# Patient Record
Sex: MALE | Race: WHITE | Employment: FULL TIME | URBAN - METROPOLITAN AREA
[De-identification: names, ages, dates, MRNs, and addresses within clinical notes are randomized per-mention and may not be internally consistent; named-entity substitution may affect disease eponyms.]

---

## 2017-07-16 PROBLEM — N20.0 CALCULUS OF BOTH KIDNEYS: Status: ACTIVE | Noted: 2017-07-16

## 2017-07-20 PROBLEM — Z80.42 FAMILY HISTORY OF PROSTATE CANCER: Status: ACTIVE | Noted: 2017-07-20

## 2017-07-20 PROBLEM — R31.29 MICROSCOPIC HEMATURIA: Status: ACTIVE | Noted: 2017-07-20

## 2019-12-04 NOTE — DISCHARGE INSTRUCTIONS
[facility]  Dr. Mary Beth Valenzuela Post-Operative Instructions Total Hip Replacement    ACTIVITIES :  1. You may be up and walking about the house with your walker. 2.  Activities around the house, such as washing dishes, fixing light meals, and your own personal care are fine. 3.  Avoid strenuous activities, such as vacuuming, lifting laundry or grocery bags. 4.  Walking is the best way to rebuild strength and stamina. Start SLOWLY and gradually increase your distance. 5.  Avoid any jogging, running or excessive stair-climbing   6. Your home physical therapist will work with you for the first 7-14 days. 7.  Follow-up with Dr. Harriet Gracia in 10-14 days. BATHING and INCISION CARE:  1. The incision may be tender to touch or feel numb: this is normal.   2.  Keep the incision clean and dry no showering until your follow-up appointment. The incision will be closed with sutures under the skin and the skin will be glued. 3.  Do not apply any lotions, ointments or oils on the incision. 4.  If you notice any excessive swelling, redness, or persistent drainage around the incision, notify the office immediately. DRIVIN. You should not drive until after your follow-up appointment. 2.  You can be in a vehicle for short distances, but if you travel any long distance, please stop about every 30 minutes and walk/stretch. 3.  You should NEVER drive while taking narcotic medication. RETURN TO WORK :  1. The decision to return to work will be determined on an individual basis. 2.  Many people who have a strenuous job (construction, heavy labor, etc) may need to be off work for up to 12 weeks. 3.  If you need a work note, please let us know as soon as possible, and not the same day you are planning to return to work. NUTRITION :  1.  Good nutrition is an essential part of healing. 2.  You should eat a balanced diet each day, including fruits, vegetables, dairy products and protein. 3.  Remember to drink plenty of water. 4.  If you have not had a bowel movement within 3 days of surgery, you will need to use a laxative or suppository that can be obtained over-the-counter at your local pharmacy. MEDICATIONS -  1. You may resume the medications you were taking before surgery. 2.  You will receive a prescription for pain medication at discharge from the hospital. The pain medication works best if taken before the pain becomes severe. 3.  To reduce stomach upset, always take the medication with food. 4.  Begin to wean yourself off the pain medication during the second week after discharge. 5.  If you need a refill, please call the office during working hours at least 2 days before your prescription runs out. Do not wait until your bottle is empty to call for a refill. 6.  You will also be prescribed a blood thinner that you will take by injection for 21 days post-operatively. 7.  DO NOT drive if you are taking narcotic pain medications. HOME HEALTH CARE:  1.   A home health care service has been set-up for you to help assist you once you leave the hospital.  2.  They will contact you either before you leave the hospital or within 24 hours once you have been discharged home. 3. A nurse will assist you with your dressing changes and a Physical Therapist with help you with your therapy needs. CALL THE OFFICE:   If you have severe pain unrelieved by the medications;   If you have a fever of 101.0°F or greater;    If you notice excessive swelling, redness, or persistent drainage from the incision or IV site; [facility] office number is [phone] from 8:00am to 5:00pm Monday through Friday. After 5:00pm, on weekends, or holidays, please leave a message with our answering service and the doctor on-call will get back to you shortly.

## 2019-12-05 ENCOUNTER — HOSPITAL ENCOUNTER (OUTPATIENT)
Dept: PREADMISSION TESTING | Age: 64
Discharge: HOME | End: 2019-12-05
Payer: COMMERCIAL

## 2019-12-05 VITALS — HEIGHT: 69 IN | WEIGHT: 222 LBS | BODY MASS INDEX: 32.88 KG/M2

## 2019-12-05 LAB
ALBUMIN SERPL-MCNC: 4.1 G/DL (ref 3.4–5)
ALBUMIN/GLOB SERPL: 1.4 {RATIO} (ref 0.8–1.7)
ALP SERPL-CCNC: 88 U/L (ref 45–117)
ALT SERPL-CCNC: 51 U/L (ref 16–61)
ANION GAP SERPL CALC-SCNC: 6 MMOL/L (ref 3–18)
APPEARANCE UR: CLEAR
APTT PPP: 28.4 SEC (ref 23–36.4)
AST SERPL-CCNC: 22 U/L (ref 10–38)
BILIRUB SERPL-MCNC: 0.6 MG/DL (ref 0.2–1)
BILIRUB UR QL: NEGATIVE
BUN SERPL-MCNC: 19 MG/DL (ref 7–18)
BUN/CREAT SERPL: 20 (ref 12–20)
CALCIUM SERPL-MCNC: 9.5 MG/DL (ref 8.5–10.1)
CHLORIDE SERPL-SCNC: 106 MMOL/L (ref 100–111)
CO2 SERPL-SCNC: 28 MMOL/L (ref 21–32)
COLOR UR: YELLOW
CREAT SERPL-MCNC: 0.94 MG/DL (ref 0.6–1.3)
ERYTHROCYTE [DISTWIDTH] IN BLOOD BY AUTOMATED COUNT: 14.1 % (ref 11.6–14.5)
ERYTHROCYTE [SEDIMENTATION RATE] IN BLOOD: 4 MM/HR (ref 0–20)
GLOBULIN SER CALC-MCNC: 3 G/DL (ref 2–4)
GLUCOSE SERPL-MCNC: 103 MG/DL (ref 74–99)
GLUCOSE UR STRIP.AUTO-MCNC: NEGATIVE MG/DL
HCT VFR BLD AUTO: 48.7 % (ref 36–48)
HGB BLD-MCNC: 16.5 G/DL (ref 13–16)
HGB UR QL STRIP: NEGATIVE
INR PPP: 1 (ref 0.8–1.2)
KETONES UR QL STRIP.AUTO: NEGATIVE MG/DL
LEUKOCYTE ESTERASE UR QL STRIP.AUTO: NEGATIVE
MCH RBC QN AUTO: 29 PG (ref 24–34)
MCHC RBC AUTO-ENTMCNC: 33.9 G/DL (ref 31–37)
MCV RBC AUTO: 85.7 FL (ref 74–97)
NITRITE UR QL STRIP.AUTO: NEGATIVE
PH UR STRIP: 6 [PH] (ref 5–8)
PLATELET # BLD AUTO: 215 K/UL (ref 135–420)
PMV BLD AUTO: 9.3 FL (ref 9.2–11.8)
POTASSIUM SERPL-SCNC: 4.2 MMOL/L (ref 3.5–5.5)
PROT SERPL-MCNC: 7.1 G/DL (ref 6.4–8.2)
PROT UR STRIP-MCNC: NEGATIVE MG/DL
PROTHROMBIN TIME: 12.5 SEC (ref 11.5–15.2)
RBC # BLD AUTO: 5.68 M/UL (ref 4.7–5.5)
SODIUM SERPL-SCNC: 140 MMOL/L (ref 136–145)
SP GR UR REFRACTOMETRY: 1.02 (ref 1–1.03)
UROBILINOGEN UR QL STRIP.AUTO: 1 EU/DL (ref 0.2–1)
WBC # BLD AUTO: 7.7 K/UL (ref 4.6–13.2)

## 2019-12-05 PROCEDURE — 93005 ELECTROCARDIOGRAM TRACING: CPT

## 2019-12-05 PROCEDURE — 85652 RBC SED RATE AUTOMATED: CPT

## 2019-12-05 PROCEDURE — 85730 THROMBOPLASTIN TIME PARTIAL: CPT

## 2019-12-05 PROCEDURE — 85027 COMPLETE CBC AUTOMATED: CPT

## 2019-12-05 PROCEDURE — 36415 COLL VENOUS BLD VENIPUNCTURE: CPT

## 2019-12-05 PROCEDURE — 85610 PROTHROMBIN TIME: CPT

## 2019-12-05 PROCEDURE — 80053 COMPREHEN METABOLIC PANEL: CPT

## 2019-12-05 PROCEDURE — 81003 URINALYSIS AUTO W/O SCOPE: CPT

## 2019-12-05 RX ORDER — ACETAMINOPHEN 500 MG
TABLET ORAL
COMMUNITY

## 2019-12-05 RX ORDER — CEFAZOLIN SODIUM/WATER 2 G/20 ML
2 SYRINGE (ML) INTRAVENOUS ONCE
Status: CANCELLED | OUTPATIENT
Start: 2019-12-19 | End: 2019-12-19

## 2019-12-05 RX ORDER — SODIUM CHLORIDE, SODIUM LACTATE, POTASSIUM CHLORIDE, CALCIUM CHLORIDE 600; 310; 30; 20 MG/100ML; MG/100ML; MG/100ML; MG/100ML
125 INJECTION, SOLUTION INTRAVENOUS CONTINUOUS
Status: CANCELLED | OUTPATIENT
Start: 2019-12-05

## 2019-12-05 RX ORDER — NAPROXEN 250 MG/1
TABLET ORAL
COMMUNITY

## 2019-12-05 NOTE — PERIOP NOTES
Please arrange for your transportation home Denies any prosthetics. Chg wipes given. Patient states family physician is aware of upcoming procedure/surgery. Stop nsaids 7 days prior to [place]. Do not put any lotion, jewelry, makeup, fingernail or toenail polish; no wigs, no private piercings; no tictac,gum and mouthwash. Denies sleep apnea. Denies family history of anesthesia complications. Please be aware that due to unforeseen circumstances, delays may occur and your patience will be appreciated. Denies shortness of breath nor chest pain while climbing stairs.  States recently has had anxiety before procedures

## 2019-12-06 LAB
BACTERIA SPEC CULT: NORMAL
BACTERIA SPEC CULT: NORMAL
SERVICE CMNT-IMP: NORMAL

## 2019-12-07 LAB
ATRIAL RATE: 81 BPM
CALCULATED P AXIS, ECG09: 58 DEGREES
CALCULATED R AXIS, ECG10: -11 DEGREES
CALCULATED T AXIS, ECG11: 20 DEGREES
DIAGNOSIS, 93000: NORMAL
P-R INTERVAL, ECG05: 198 MS
Q-T INTERVAL, ECG07: 372 MS
QRS DURATION, ECG06: 98 MS
QTC CALCULATION (BEZET), ECG08: 432 MS
VENTRICULAR RATE, ECG03: 81 BPM

## 2019-12-18 ENCOUNTER — ANESTHESIA EVENT (OUTPATIENT)
Dept: SURGERY | Age: 64
DRG: 470 | End: 2019-12-18
Payer: COMMERCIAL

## 2019-12-18 PROBLEM — M16.12 OSTEOARTHRITIS OF LEFT HIP: Status: ACTIVE | Noted: 2019-12-18

## 2019-12-18 NOTE — H&P
Patient Name:  Salvador Whalen ([id number])  YOB: 1955      Chief Complaint:  Cervical and lumbar pain and left hip pain. History of Chief Complaint:  Mr. Claude Fendt comes in today for evaluation of multiple complaints. He was previously evaluated and noted to have degenerative changes in the lumbar spine with stenosis, cervical spine stenosis as well as bilateral hip arthrosis, left more than right. She underwent an injection with minimal resolve of hip complaints as well as mild improvement in his lumbar complaints. He reports picking up something heavy over the past few days straining his lumbar spine again. Additionally he is pending cervical epidural for his complaints of upper arm and extremity pain. Today he presents and his symptoms about the hip continue to be progressive with limitations of motion and difficulty with ambulation for short distances as well as limitations of daily life and difficulty putting on shoes and socks. Past Medical/Surgical History:    Disease/Disorder Date Side Surgery Date Side Comment   Arthritis         Enlarged prostate            Knee replacement 1998 left    Broken nose   Rhinoplasty        Allergies:    Ingredient Reaction Medication Name Comment   NO KNOWN ALLERGIES          Current Medications:    Medication Directions   Valium 5 mg tablet take 1 tablet (5MG)  by oral route 1/2 hour prior to MRI; MUST have    Valium 5 mg tablet take 1 tablet (5MG)  by oral route 1/2 hour prior to MRI; MUST have      Social History:    SMOKING  Status Tobacco Type Units Per Day Yrs Used   Never smoker        ALCOHOL  There is a history of alcohol use. consumed occasionally. Family History:    Disease Detail Family Member Age Cause of Death Comments   Family history of Hypertension   N    Alzheimer's disease Mother  N    Cancer, lung Father  Y      Review of Systems:    Pertinent positives include joint stiffness.   Pertinent negatives include chills, fever and muscle weakness. Vitals:  Date BP Pulse Temp (F) Resp. (per min.) Height (Total in.) Weight (lbs.) BMI   11/13/2019     70.00 210.00 30.13   10/14/2019     70.00  30.13   09/11/2019     70.00  30.13     Physical Examination:    Psychiatric/General:  No acute distress; pleasant and accommodating. HEENT:  Moist mucous membranes intact. Lymphatic:  Neck is supple with no lymphadenopathy upon evaluation. Respiratory:   Equal bilateral chest wall movement with inspiration; no wheezes or stridor audible. Cardiovascular:    Regular rate and rhythm, as noted by upper extremity pulses. Musculoskeletal: On evaluation of the lower extremity, evaluation of the left hip with forward flexion, internal and external rotation is with limitation of motion. Standing and evaluation of gait is antalgic. Gross motor is intact. Skin is without ulceration or lesion. Assessment: Lower extremity complaints as noted above with focal symptoms about the hip are consistent with underlying arthrosis as appreciated on previous evaluation and x-rays. Recommendation:  I reviewed with him the findings and recommendations for moving forward with total hip arthroplasty. The risks include infection, bleeding, deep venous thrombosis, pulmonary embolism, heart attack, stroke, death, arthrofibrosis, need for manipulation, neurovascular compromise, need for revision surgical intervention, persistent long-term pain, need for chronic pain management, and metallic allergies. We will continue with plans for scheduled injection for his cervical spine. Additionally we can try focal injection of the lower lumbar spine on the right for his continued complaints about his new focal pain after his strain injury most recently.           Jean Garner,

## 2019-12-19 ENCOUNTER — HOSPITAL ENCOUNTER (INPATIENT)
Age: 64
LOS: 1 days | Discharge: HOME HEALTH | DRG: 470 | End: 2019-12-20
Attending: ORTHOPAEDIC SURGERY | Admitting: ORTHOPAEDIC SURGERY
Payer: COMMERCIAL

## 2019-12-19 ENCOUNTER — ANESTHESIA (OUTPATIENT)
Dept: SURGERY | Age: 64
DRG: 470 | End: 2019-12-19
Payer: COMMERCIAL

## 2019-12-19 ENCOUNTER — APPOINTMENT (OUTPATIENT)
Dept: GENERAL RADIOLOGY | Age: 64
DRG: 470 | End: 2019-12-19
Attending: ORTHOPAEDIC SURGERY
Payer: COMMERCIAL

## 2019-12-19 ENCOUNTER — APPOINTMENT (OUTPATIENT)
Dept: CT IMAGING | Age: 64
DRG: 470 | End: 2019-12-19
Attending: FAMILY MEDICINE
Payer: COMMERCIAL

## 2019-12-19 DIAGNOSIS — M16.12 PRIMARY OSTEOARTHRITIS OF LEFT HIP: Primary | ICD-10-CM

## 2019-12-19 PROBLEM — Z96.649 STATUS POST THR (TOTAL HIP REPLACEMENT): Status: ACTIVE | Noted: 2019-12-19

## 2019-12-19 LAB
ABO + RH BLD: NORMAL
BLOOD GROUP ANTIBODIES SERPL: NORMAL
SPECIMEN EXP DATE BLD: NORMAL

## 2019-12-19 PROCEDURE — 86900 BLOOD TYPING SEROLOGIC ABO: CPT

## 2019-12-19 PROCEDURE — 77030013708 HC HNDPC SUC IRR PULS STRY –B: Performed by: ORTHOPAEDIC SURGERY

## 2019-12-19 PROCEDURE — 77030006643: Performed by: ANESTHESIOLOGY

## 2019-12-19 PROCEDURE — C9290 INJ, BUPIVACAINE LIPOSOME: HCPCS | Performed by: ORTHOPAEDIC SURGERY

## 2019-12-19 PROCEDURE — 65270000029 HC RM PRIVATE

## 2019-12-19 PROCEDURE — 77030018846 HC SOL IRR STRL H20 ICUM -A: Performed by: ORTHOPAEDIC SURGERY

## 2019-12-19 PROCEDURE — 97161 PT EVAL LOW COMPLEX 20 MIN: CPT

## 2019-12-19 PROCEDURE — 74011000258 HC RX REV CODE- 258: Performed by: ORTHOPAEDIC SURGERY

## 2019-12-19 PROCEDURE — 77030038010: Performed by: ORTHOPAEDIC SURGERY

## 2019-12-19 PROCEDURE — C1776 JOINT DEVICE (IMPLANTABLE): HCPCS | Performed by: ORTHOPAEDIC SURGERY

## 2019-12-19 PROCEDURE — 73501 X-RAY EXAM HIP UNI 1 VIEW: CPT

## 2019-12-19 PROCEDURE — 74011000250 HC RX REV CODE- 250: Performed by: REGISTERED NURSE

## 2019-12-19 PROCEDURE — 97116 GAIT TRAINING THERAPY: CPT

## 2019-12-19 PROCEDURE — 77030027138 HC INCENT SPIROMETER -A: Performed by: ORTHOPAEDIC SURGERY

## 2019-12-19 PROCEDURE — 77030034694 HC SCPL CANADY PLSM DISP USMD -E: Performed by: ORTHOPAEDIC SURGERY

## 2019-12-19 PROCEDURE — 77030031140 HC SUT VCRL3 J&J -A: Performed by: ORTHOPAEDIC SURGERY

## 2019-12-19 PROCEDURE — 76010000149 HC OR TIME 1 TO 1.5 HR: Performed by: ORTHOPAEDIC SURGERY

## 2019-12-19 PROCEDURE — 74011000250 HC RX REV CODE- 250: Performed by: ANESTHESIOLOGY

## 2019-12-19 PROCEDURE — 77030018836 HC SOL IRR NACL ICUM -A: Performed by: ORTHOPAEDIC SURGERY

## 2019-12-19 PROCEDURE — 77030040361 HC SLV COMPR DVT MDII -B: Performed by: ORTHOPAEDIC SURGERY

## 2019-12-19 PROCEDURE — 77030020782 HC GWN BAIR PAWS FLX 3M -B: Performed by: ORTHOPAEDIC SURGERY

## 2019-12-19 PROCEDURE — 77030029099 HC BN WAX SSPC -A: Performed by: ORTHOPAEDIC SURGERY

## 2019-12-19 PROCEDURE — 77030031139 HC SUT VCRL2 J&J -A: Performed by: ORTHOPAEDIC SURGERY

## 2019-12-19 PROCEDURE — 70450 CT HEAD/BRAIN W/O DYE: CPT

## 2019-12-19 PROCEDURE — 77030003666 HC NDL SPINAL BD -A: Performed by: ORTHOPAEDIC SURGERY

## 2019-12-19 PROCEDURE — 74011250636 HC RX REV CODE- 250/636: Performed by: ORTHOPAEDIC SURGERY

## 2019-12-19 PROCEDURE — 77010033678 HC OXYGEN DAILY

## 2019-12-19 PROCEDURE — 77030013079 HC BLNKT BAIR HGGR 3M -A: Performed by: ANESTHESIOLOGY

## 2019-12-19 PROCEDURE — 74011250637 HC RX REV CODE- 250/637: Performed by: SPECIALIST

## 2019-12-19 PROCEDURE — 74011250636 HC RX REV CODE- 250/636: Performed by: SPECIALIST

## 2019-12-19 PROCEDURE — 77030008477 HC STYL SATN SLP COVD -A: Performed by: ANESTHESIOLOGY

## 2019-12-19 PROCEDURE — 74011250637 HC RX REV CODE- 250/637: Performed by: ORTHOPAEDIC SURGERY

## 2019-12-19 PROCEDURE — 77030008683 HC TU ET CUF COVD -A: Performed by: ANESTHESIOLOGY

## 2019-12-19 PROCEDURE — 74011000250 HC RX REV CODE- 250: Performed by: ORTHOPAEDIC SURGERY

## 2019-12-19 PROCEDURE — 77030037875 HC DRSG MEPILEX <16IN BORD MOLN -A: Performed by: ORTHOPAEDIC SURGERY

## 2019-12-19 PROCEDURE — 0SRB04A REPLACEMENT OF LEFT HIP JOINT WITH CERAMIC ON POLYETHYLENE SYNTHETIC SUBSTITUTE, UNCEMENTED, OPEN APPROACH: ICD-10-PCS | Performed by: ORTHOPAEDIC SURGERY

## 2019-12-19 PROCEDURE — 76210000017 HC OR PH I REC 1.5 TO 2 HR: Performed by: ORTHOPAEDIC SURGERY

## 2019-12-19 PROCEDURE — 77030039267 HC ADH SKN EXOFIN S2SG -B: Performed by: ORTHOPAEDIC SURGERY

## 2019-12-19 PROCEDURE — 36415 COLL VENOUS BLD VENIPUNCTURE: CPT

## 2019-12-19 PROCEDURE — 76060000033 HC ANESTHESIA 1 TO 1.5 HR: Performed by: ORTHOPAEDIC SURGERY

## 2019-12-19 PROCEDURE — 74011250636 HC RX REV CODE- 250/636: Performed by: REGISTERED NURSE

## 2019-12-19 DEVICE — PINNACLE GRIPTION ACETABULAR SHELL SECTOR 52MM OD
Type: IMPLANTABLE DEVICE | Site: HIP | Status: FUNCTIONAL
Brand: PINNACLE GRIPTION

## 2019-12-19 DEVICE — HEAD FEM DIA36MM +1.5MM OFFSET 12/14 TAPR HIP CERAMIC: Type: IMPLANTABLE DEVICE | Site: HIP | Status: FUNCTIONAL

## 2019-12-19 DEVICE — PINNACLE HIP SOLUTIONS ALTRX POLYETHYLENE ACETABULAR LINER NEUTRAL 36MM ID 52MM OD
Type: IMPLANTABLE DEVICE | Site: HIP | Status: FUNCTIONAL
Brand: PINNACLE ALTRX

## 2019-12-19 DEVICE — COMPONENT TOT HIP PRIMARY CERM ALTRX: Type: IMPLANTABLE DEVICE | Site: HIP | Status: FUNCTIONAL

## 2019-12-19 DEVICE — STEM FEM SZ 5 L105MM 12/14 TAPR HI OFFSET HIP DUOFIX CLLRD: Type: IMPLANTABLE DEVICE | Site: HIP | Status: FUNCTIONAL

## 2019-12-19 DEVICE — APEX HOLE ELIMINATOR - PS
Type: IMPLANTABLE DEVICE | Site: HIP | Status: FUNCTIONAL
Brand: APEX

## 2019-12-19 RX ORDER — NALOXONE HYDROCHLORIDE 0.4 MG/ML
0.4 INJECTION, SOLUTION INTRAMUSCULAR; INTRAVENOUS; SUBCUTANEOUS AS NEEDED
Status: DISCONTINUED | OUTPATIENT
Start: 2019-12-19 | End: 2019-12-20 | Stop reason: HOSPADM

## 2019-12-19 RX ORDER — ONDANSETRON 2 MG/ML
4 INJECTION INTRAMUSCULAR; INTRAVENOUS
Status: DISCONTINUED | OUTPATIENT
Start: 2019-12-19 | End: 2019-12-20 | Stop reason: HOSPADM

## 2019-12-19 RX ORDER — DEXAMETHASONE SODIUM PHOSPHATE 4 MG/ML
8 INJECTION, SOLUTION INTRA-ARTICULAR; INTRALESIONAL; INTRAMUSCULAR; INTRAVENOUS; SOFT TISSUE ONCE
Status: COMPLETED | OUTPATIENT
Start: 2019-12-19 | End: 2019-12-19

## 2019-12-19 RX ORDER — CEFAZOLIN SODIUM/WATER 2 G/20 ML
2 SYRINGE (ML) INTRAVENOUS ONCE
Status: COMPLETED | OUTPATIENT
Start: 2019-12-19 | End: 2019-12-19

## 2019-12-19 RX ORDER — CEFAZOLIN SODIUM/WATER 2 G/20 ML
2 SYRINGE (ML) INTRAVENOUS EVERY 8 HOURS
Status: COMPLETED | OUTPATIENT
Start: 2019-12-19 | End: 2019-12-20

## 2019-12-19 RX ORDER — OXYCODONE HYDROCHLORIDE 5 MG/1
10 TABLET ORAL
Status: DISCONTINUED | OUTPATIENT
Start: 2019-12-19 | End: 2019-12-20 | Stop reason: HOSPADM

## 2019-12-19 RX ORDER — PROPOFOL 10 MG/ML
INJECTION, EMULSION INTRAVENOUS AS NEEDED
Status: DISCONTINUED | OUTPATIENT
Start: 2019-12-19 | End: 2019-12-19 | Stop reason: HOSPADM

## 2019-12-19 RX ORDER — ACETAMINOPHEN 500 MG
1000 TABLET ORAL ONCE
Status: COMPLETED | OUTPATIENT
Start: 2019-12-19 | End: 2019-12-19

## 2019-12-19 RX ORDER — CELECOXIB 100 MG/1
200 CAPSULE ORAL ONCE
Status: COMPLETED | OUTPATIENT
Start: 2019-12-19 | End: 2019-12-19

## 2019-12-19 RX ORDER — ONDANSETRON 2 MG/ML
4 INJECTION INTRAMUSCULAR; INTRAVENOUS ONCE
Status: DISCONTINUED | OUTPATIENT
Start: 2019-12-19 | End: 2019-12-19 | Stop reason: HOSPADM

## 2019-12-19 RX ORDER — ROCURONIUM BROMIDE 10 MG/ML
INJECTION, SOLUTION INTRAVENOUS AS NEEDED
Status: DISCONTINUED | OUTPATIENT
Start: 2019-12-19 | End: 2019-12-19 | Stop reason: HOSPADM

## 2019-12-19 RX ORDER — SODIUM CHLORIDE, SODIUM LACTATE, POTASSIUM CHLORIDE, CALCIUM CHLORIDE 600; 310; 30; 20 MG/100ML; MG/100ML; MG/100ML; MG/100ML
125 INJECTION, SOLUTION INTRAVENOUS CONTINUOUS
Status: DISCONTINUED | OUTPATIENT
Start: 2019-12-19 | End: 2019-12-20 | Stop reason: HOSPADM

## 2019-12-19 RX ORDER — FENTANYL CITRATE 50 UG/ML
25 INJECTION, SOLUTION INTRAMUSCULAR; INTRAVENOUS AS NEEDED
Status: COMPLETED | OUTPATIENT
Start: 2019-12-19 | End: 2019-12-19

## 2019-12-19 RX ORDER — HYDROMORPHONE HYDROCHLORIDE 2 MG/ML
0.2 INJECTION, SOLUTION INTRAMUSCULAR; INTRAVENOUS; SUBCUTANEOUS
Status: DISCONTINUED | OUTPATIENT
Start: 2019-12-19 | End: 2019-12-19 | Stop reason: HOSPADM

## 2019-12-19 RX ORDER — SODIUM CHLORIDE 0.9 % (FLUSH) 0.9 %
5-40 SYRINGE (ML) INJECTION EVERY 8 HOURS
Status: DISCONTINUED | OUTPATIENT
Start: 2019-12-19 | End: 2019-12-20 | Stop reason: HOSPADM

## 2019-12-19 RX ORDER — MIDAZOLAM HYDROCHLORIDE 1 MG/ML
INJECTION, SOLUTION INTRAMUSCULAR; INTRAVENOUS AS NEEDED
Status: DISCONTINUED | OUTPATIENT
Start: 2019-12-19 | End: 2019-12-19 | Stop reason: HOSPADM

## 2019-12-19 RX ORDER — NALOXONE HYDROCHLORIDE 0.4 MG/ML
0.1 INJECTION, SOLUTION INTRAMUSCULAR; INTRAVENOUS; SUBCUTANEOUS AS NEEDED
Status: DISCONTINUED | OUTPATIENT
Start: 2019-12-19 | End: 2019-12-19 | Stop reason: HOSPADM

## 2019-12-19 RX ORDER — PREGABALIN 25 MG/1
25 CAPSULE ORAL ONCE
Status: COMPLETED | OUTPATIENT
Start: 2019-12-19 | End: 2019-12-19

## 2019-12-19 RX ORDER — MAGNESIUM SULFATE 100 %
4 CRYSTALS MISCELLANEOUS AS NEEDED
Status: DISCONTINUED | OUTPATIENT
Start: 2019-12-19 | End: 2019-12-19 | Stop reason: HOSPADM

## 2019-12-19 RX ORDER — LANOLIN ALCOHOL/MO/W.PET/CERES
1 CREAM (GRAM) TOPICAL 3 TIMES DAILY
Status: DISCONTINUED | OUTPATIENT
Start: 2019-12-19 | End: 2019-12-20 | Stop reason: HOSPADM

## 2019-12-19 RX ORDER — HYDROMORPHONE HYDROCHLORIDE 1 MG/ML
1 INJECTION, SOLUTION INTRAMUSCULAR; INTRAVENOUS; SUBCUTANEOUS
Status: DISCONTINUED | OUTPATIENT
Start: 2019-12-19 | End: 2019-12-20 | Stop reason: HOSPADM

## 2019-12-19 RX ORDER — FENTANYL CITRATE 50 UG/ML
50 INJECTION, SOLUTION INTRAMUSCULAR; INTRAVENOUS
Status: DISCONTINUED | OUTPATIENT
Start: 2019-12-19 | End: 2019-12-19 | Stop reason: HOSPADM

## 2019-12-19 RX ORDER — GLYCOPYRROLATE 0.2 MG/ML
INJECTION INTRAMUSCULAR; INTRAVENOUS AS NEEDED
Status: DISCONTINUED | OUTPATIENT
Start: 2019-12-19 | End: 2019-12-19 | Stop reason: HOSPADM

## 2019-12-19 RX ORDER — ONDANSETRON 2 MG/ML
INJECTION INTRAMUSCULAR; INTRAVENOUS AS NEEDED
Status: DISCONTINUED | OUTPATIENT
Start: 2019-12-19 | End: 2019-12-19 | Stop reason: HOSPADM

## 2019-12-19 RX ORDER — KETAMINE HCL IN 0.9 % NACL 50 MG/5 ML
SYRINGE (ML) INTRAVENOUS AS NEEDED
Status: DISCONTINUED | OUTPATIENT
Start: 2019-12-19 | End: 2019-12-19 | Stop reason: HOSPADM

## 2019-12-19 RX ORDER — LABETALOL HYDROCHLORIDE 5 MG/ML
10 INJECTION, SOLUTION INTRAVENOUS ONCE
Status: COMPLETED | OUTPATIENT
Start: 2019-12-19 | End: 2019-12-19

## 2019-12-19 RX ORDER — SODIUM CHLORIDE 9 MG/ML
125 INJECTION, SOLUTION INTRAVENOUS CONTINUOUS
Status: DISCONTINUED | OUTPATIENT
Start: 2019-12-19 | End: 2019-12-19 | Stop reason: HOSPADM

## 2019-12-19 RX ORDER — SODIUM CHLORIDE 0.9 % (FLUSH) 0.9 %
5-40 SYRINGE (ML) INJECTION AS NEEDED
Status: DISCONTINUED | OUTPATIENT
Start: 2019-12-19 | End: 2019-12-20 | Stop reason: HOSPADM

## 2019-12-19 RX ORDER — ENOXAPARIN SODIUM 100 MG/ML
40 INJECTION SUBCUTANEOUS DAILY
Status: DISCONTINUED | OUTPATIENT
Start: 2019-12-20 | End: 2019-12-20 | Stop reason: HOSPADM

## 2019-12-19 RX ORDER — ACETAMINOPHEN 325 MG/1
650 TABLET ORAL EVERY 6 HOURS
Status: DISCONTINUED | OUTPATIENT
Start: 2019-12-19 | End: 2019-12-20 | Stop reason: HOSPADM

## 2019-12-19 RX ORDER — HYDROMORPHONE HYDROCHLORIDE 2 MG/ML
INJECTION, SOLUTION INTRAMUSCULAR; INTRAVENOUS; SUBCUTANEOUS AS NEEDED
Status: DISCONTINUED | OUTPATIENT
Start: 2019-12-19 | End: 2019-12-19 | Stop reason: HOSPADM

## 2019-12-19 RX ORDER — CELECOXIB 100 MG/1
200 CAPSULE ORAL 2 TIMES DAILY
Status: DISCONTINUED | OUTPATIENT
Start: 2019-12-19 | End: 2019-12-20 | Stop reason: HOSPADM

## 2019-12-19 RX ORDER — DEXTROSE MONOHYDRATE 100 MG/ML
125-250 INJECTION, SOLUTION INTRAVENOUS AS NEEDED
Status: DISCONTINUED | OUTPATIENT
Start: 2019-12-19 | End: 2019-12-19 | Stop reason: HOSPADM

## 2019-12-19 RX ORDER — LIDOCAINE HYDROCHLORIDE 20 MG/ML
INJECTION, SOLUTION EPIDURAL; INFILTRATION; INTRACAUDAL; PERINEURAL AS NEEDED
Status: DISCONTINUED | OUTPATIENT
Start: 2019-12-19 | End: 2019-12-19 | Stop reason: HOSPADM

## 2019-12-19 RX ORDER — KETOROLAC TROMETHAMINE 30 MG/ML
15 INJECTION, SOLUTION INTRAMUSCULAR; INTRAVENOUS
Status: COMPLETED | OUTPATIENT
Start: 2019-12-19 | End: 2019-12-19

## 2019-12-19 RX ORDER — DEXMEDETOMIDINE HYDROCHLORIDE 4 UG/ML
INJECTION, SOLUTION INTRAVENOUS AS NEEDED
Status: DISCONTINUED | OUTPATIENT
Start: 2019-12-19 | End: 2019-12-19 | Stop reason: HOSPADM

## 2019-12-19 RX ADMIN — PROPOFOL 200 MG: 10 INJECTION, EMULSION INTRAVENOUS at 14:07

## 2019-12-19 RX ADMIN — SODIUM CHLORIDE, SODIUM LACTATE, POTASSIUM CHLORIDE, AND CALCIUM CHLORIDE 1000 ML: 600; 310; 30; 20 INJECTION, SOLUTION INTRAVENOUS at 11:54

## 2019-12-19 RX ADMIN — DEXMEDETOMIDINE HYDROCHLORIDE 4 MCG: 4 INJECTION, SOLUTION INTRAVENOUS at 14:32

## 2019-12-19 RX ADMIN — SUGAMMADEX 200 MG: 100 INJECTION, SOLUTION INTRAVENOUS at 15:18

## 2019-12-19 RX ADMIN — DEXMEDETOMIDINE HYDROCHLORIDE 4 MCG: 4 INJECTION, SOLUTION INTRAVENOUS at 14:38

## 2019-12-19 RX ADMIN — FENTANYL CITRATE 25 MCG: 50 INJECTION, SOLUTION INTRAMUSCULAR; INTRAVENOUS at 15:52

## 2019-12-19 RX ADMIN — LIDOCAINE HYDROCHLORIDE 100 MG: 20 INJECTION, SOLUTION EPIDURAL; INFILTRATION; INTRACAUDAL; PERINEURAL at 14:07

## 2019-12-19 RX ADMIN — OXYCODONE HYDROCHLORIDE 10 MG: 5 TABLET ORAL at 18:27

## 2019-12-19 RX ADMIN — FENTANYL CITRATE 25 MCG: 50 INJECTION, SOLUTION INTRAMUSCULAR; INTRAVENOUS at 15:59

## 2019-12-19 RX ADMIN — Medication 50 MG: at 14:10

## 2019-12-19 RX ADMIN — TRANEXAMIC ACID 1 G: 100 INJECTION, SOLUTION INTRAVENOUS at 15:17

## 2019-12-19 RX ADMIN — Medication 20 MG: at 14:34

## 2019-12-19 RX ADMIN — CELECOXIB 200 MG: 100 CAPSULE ORAL at 21:36

## 2019-12-19 RX ADMIN — ONDANSETRON HYDROCHLORIDE 4 MG: 2 INJECTION INTRAMUSCULAR; INTRAVENOUS at 14:36

## 2019-12-19 RX ADMIN — GLYCOPYRROLATE 0.2 MG: 0.2 INJECTION INTRAMUSCULAR; INTRAVENOUS at 14:49

## 2019-12-19 RX ADMIN — HYDROMORPHONE HYDROCHLORIDE 0.5 MG: 2 INJECTION, SOLUTION INTRAMUSCULAR; INTRAVENOUS; SUBCUTANEOUS at 14:57

## 2019-12-19 RX ADMIN — ACETAMINOPHEN 1000 MG: 500 TABLET ORAL at 11:50

## 2019-12-19 RX ADMIN — KETOROLAC TROMETHAMINE 15 MG: 30 INJECTION, SOLUTION INTRAMUSCULAR at 16:11

## 2019-12-19 RX ADMIN — Medication 20 MG: at 14:49

## 2019-12-19 RX ADMIN — DEXMEDETOMIDINE HYDROCHLORIDE 8 MCG: 4 INJECTION, SOLUTION INTRAVENOUS at 14:07

## 2019-12-19 RX ADMIN — MIDAZOLAM 2 MG: 1 INJECTION INTRAMUSCULAR; INTRAVENOUS at 14:01

## 2019-12-19 RX ADMIN — DEXAMETHASONE SODIUM PHOSPHATE 8 MG: 4 INJECTION, SOLUTION INTRAMUSCULAR; INTRAVENOUS at 11:52

## 2019-12-19 RX ADMIN — FERROUS SULFATE TAB 325 MG (65 MG ELEMENTAL FE) 325 MG: 325 (65 FE) TAB at 21:36

## 2019-12-19 RX ADMIN — Medication 2 G: at 21:36

## 2019-12-19 RX ADMIN — CELECOXIB 200 MG: 100 CAPSULE ORAL at 11:50

## 2019-12-19 RX ADMIN — ACETAMINOPHEN 650 MG: 325 TABLET ORAL at 23:03

## 2019-12-19 RX ADMIN — PREGABALIN 25 MG: 25 CAPSULE ORAL at 11:50

## 2019-12-19 RX ADMIN — Medication 10 MG: at 14:32

## 2019-12-19 RX ADMIN — FENTANYL CITRATE 25 MCG: 50 INJECTION, SOLUTION INTRAMUSCULAR; INTRAVENOUS at 16:10

## 2019-12-19 RX ADMIN — Medication 2 G: at 14:23

## 2019-12-19 RX ADMIN — FERROUS SULFATE TAB 325 MG (65 MG ELEMENTAL FE) 325 MG: 325 (65 FE) TAB at 18:27

## 2019-12-19 RX ADMIN — SODIUM CHLORIDE, SODIUM LACTATE, POTASSIUM CHLORIDE, AND CALCIUM CHLORIDE: 600; 310; 30; 20 INJECTION, SOLUTION INTRAVENOUS at 14:44

## 2019-12-19 RX ADMIN — ACETAMINOPHEN 650 MG: 325 TABLET ORAL at 18:27

## 2019-12-19 RX ADMIN — Medication 10 MG: at 15:26

## 2019-12-19 RX ADMIN — LABETALOL HYDROCHLORIDE 10 MG: 5 INJECTION INTRAVENOUS at 16:32

## 2019-12-19 RX ADMIN — TRANEXAMIC ACID 1 G: 100 INJECTION, SOLUTION INTRAVENOUS at 14:19

## 2019-12-19 RX ADMIN — FENTANYL CITRATE 25 MCG: 50 INJECTION, SOLUTION INTRAMUSCULAR; INTRAVENOUS at 16:34

## 2019-12-19 RX ADMIN — SODIUM CHLORIDE, SODIUM LACTATE, POTASSIUM CHLORIDE, AND CALCIUM CHLORIDE 125 ML/HR: 600; 310; 30; 20 INJECTION, SOLUTION INTRAVENOUS at 16:01

## 2019-12-19 RX ADMIN — HYDROMORPHONE HYDROCHLORIDE 0.5 MG: 2 INJECTION, SOLUTION INTRAMUSCULAR; INTRAVENOUS; SUBCUTANEOUS at 14:30

## 2019-12-19 RX ADMIN — SODIUM CHLORIDE, SODIUM LACTATE, POTASSIUM CHLORIDE, AND CALCIUM CHLORIDE 125 ML/HR: 600; 310; 30; 20 INJECTION, SOLUTION INTRAVENOUS at 11:49

## 2019-12-19 NOTE — INTERVAL H&P NOTE
H&P Update: 
Corwin Negrete was seen and examined. History and physical has been reviewed. The patient has been examined. There have been no significant clinical changes since the completion of the originally dated History and Physical. 
Patient identified by surgeon; surgical site was confirmed by patient and surgeon. Plan for left total hip

## 2019-12-19 NOTE — PROGRESS NOTES
1220  Received client from PACU in satisfactory condition. Client is a pt of Dr. Anne Marin. Pt had a Left Total Hip Replacement today. Client is A/O X 4. Client is calm and cooperative. Clients PERRLA. Denies numbness or tingling to any extremity. With + Posterior Tibial and Dorsalis Pedis pulses. Capillary Refill less than 3 seconds. Skin is warm , dry and skin color is appropriate to race. Client is negative for JVD. Bibasilar breath sounds clear. No use of accessory muscles. Bowel sounds hypoactive to all quadrants. Abdomen is soft and non-tender. Client has a Mepilex dressing to the left Hip. Dressing is dry and intact. No other skin integrity issues present. Salo hose applied to both legs. Sequential compression device applied. Client has LH 18 gauge PIV present and running LR at 125 ml/hr. . Clients pain is 7/10 on 0-10 scale. Client oriented to call bell use as well as bed use. Client oriented to phone and how to order meals. Call bell within reach. Bed in low position. Three side rails up. Armbands/ Fall alert band intact. Dual skin check done with Juwan Lopez RN. No skin breakdown noted. 1729    Voided 300 ml of clear yellow urine via urinal  1829    Pt medicated with [id number] 10 mg po  For pain level 7/10. Dinner served. Family at bedside.    1915   Pt was seen by PT.

## 2019-12-19 NOTE — OP NOTES
OPERATIVE NOTE    Patient: Bridgette Lubin MRN: [id number]  SSN: [id number]    YOB: 1955  Age: 75 y.o. Sex: male      Indications: This is a 75y.o. year-old male who presents with hip pain. He was positive for hip OA. The patient was admitted for surgery as conservative measures have failed. Date of Procedure: 12/19/2019     Preoperative Diagnosis: LEFT HIP OSTEOARTHRITIS    Postoperative Diagnosis: LEFT HIP OSTEOARTHRITIS      Procedure: Procedure(s):  LEFT TOTAL HIP ARTHROPLASTY ANTERIOR APPROACH WITH C-ARM    Anesthesia: general    Surgeon: Marya John, and Surgical Assistant: Marisabel Mason PA-C    Assistant: Circ-1: Michael De Luna RN  Circ-2: Casey Duckworth RN  Circ-Relief: Andrey Castano  Radiology Technician: Missy Oliva  Scrub RN-1: Eve Russell RN  Scrub RN-2: Amira Alcantara NP  Scrub RN-Relief: Diego Pathak RN  Surg Asst-1: Melissa Bennett    Estimated Blood Loss: 200ml  IVF 2000 ml    Specimens: * No specimens in log *     Drains: none    Implants:   Implant Name Type Inv. Item Serial No.  Lot No. LRB No. Used Action   CUP ACET SECTOR GRIPTION 52MM -- TI - [id number]  CUP ACET SECTOR GRIPTION 52MM -- TI  [facility] [id number] Left 1 Implanted   LINER ACET PINN NEUT [id number] -- ALTRX - [id number]  LINER ACET PINN NEUT [id number] -- ALTRX  [facility] [id number] Left 1 Implanted   ELIMINATOR APEX HOLE POSITIVE - [id number]  ELIMINATOR APEX HOLE POSITIVE  [facility] [id number] Left 1 Implanted   STEM FEM TAPR SZ5 HI OFFSET -- ACTIS - [id number]  STEM FEM TAPR SZ5 HI OFFSET -- ACTIS  [facility] [id number] Left 1 Implanted   HEAD FEM 36MM +1.5MM NK -- BIOLOX DELTA - [id number]  HEAD FEM 36MM +1.5MM NK -- BIOLOX DELTA  [facility] [id number] Left 1 Implanted       Complications: None; patient tolerated the procedure well. Procedure:  The patient was greeted by anesthesia and taken to the operative suite where he underwent general endotracheal anesthesia. He was positioned on a radiolucent [place] hip table with both feet secured and positioned in holding boots. The left hip was sterilely prepped and draped in standard fashion. A 3.5 inch incision was made just below the ASIS in line with the medial border of the tensor fascia rupert. Incision was made and the Tensor was mobilized laterally and the Rectus was mobilized medially. The circumflex vessels were isolated and cauterized to prevent post-operative bleeding. Pre-capsular fat was removed and an anterior H shaped capsulotomy was performed. Retractors were positioned and a femoral neck osteotomy was made with an oscillating saw. The head was dislocated from the acetabulum and the soft tissue labrum was removed with sharp scalpel dissection. Progressive reaming was performed with 45 degrees of abduction and 20 degrees of anteversion. Fluoroscopy was utilized to help confirm appropriate cup placement. A DePuy Sector  52 mm cup was impacted and found to be extremely stable. A single dome hole plug was placed. A 36 liner was placed and implacted and found to be stable. Attention was turned to the femoral shaft. The foot was dropped to the floor, externally rotated 120 degrees and adducted. This exposed the femoral canal nicely with the use of a femoral hook. Progressive broaching was performed until excellent longitudinal and rotational stability was obtained. Trial components were placed and fluoroscopy was utilized to gain excellent leg length equality, hip offset and stability with traction as well as internal and external rotation. Trial components were removed and the tissues were irrigated with 1000 cc of sterile saline with Bacitracin. The cautery unit was utilized to treat the surrounding soft tissues and prevent post operative bleeding and hematoma formation.   Subsequently, a size 5 Depuy Trilock femoral component with a hi neck angle was impacted into position. A 36+1.5 head was placed, impacted and reduced into the acetabular shell. Fluoroscopy confirmed excellent placement, leg length equality, hip offset and stability. The surrounding tissues were injected with 30 cc's of .25 percent Marcaine with epi and 30 mg of Exparell Dilute to 100 ml with saline for post operative pain control. There was minimal bleeding and no drain was placed. The Tensor was reapproximated with running Vicryl. The skin edges were reapproximated with 2-0 Vicry and the skin withDermabond Prineo skin sealant as well as a sterile dressing. The patient recovered from anesthesia and was transferred to the post anesthesia care unit in stable condition.      Danielle Webber DO  12/19/2019  3:23 PM

## 2019-12-19 NOTE — ANESTHESIA POSTPROCEDURE EVALUATION
Post-Anesthesia Evaluation and Assessment    Cardiovascular Function/Vital Signs  Visit Vitals  /88   Pulse 68   Temp 36.8 °C (98.2 °F)   Resp 11   Ht 5' 9\" (1.753 m)   Wt 99.9 kg (220 lb 4 oz)   SpO2 97%   BMI 32.53 kg/m²       Patient is status post Procedure(s):  LEFT TOTAL HIP ARTHROPLASTY ANTERIOR APPROACH WITH C-ARM. Nausea/Vomiting: Controlled. Postoperative hydration reviewed and adequate. Pain:  Pain Scale 1: Visual (12/19/19 1634)  Pain Intensity 1: 6 (12/19/19 1634)   Managed. Neurological Status:   Neuro (WDL): Exceptions to WDL (12/19/19 1600)   At baseline. Mental Status and Level of Consciousness: Arousable. Pulmonary Status:   O2 Device: Nasal cannula (12/19/19 1600)   Adequate oxygenation and airway patent. Complications related to anesthesia: None    Post-anesthesia assessment completed. No concerns. Patient has met all discharge requirements.     Signed By: Nayan Faust MD    December 19, 2019

## 2019-12-19 NOTE — BRIEF OP NOTE
BRIEF OPERATIVE NOTE    Date of Procedure: 12/19/2019   Preoperative Diagnosis: LEFT HIP OSTEOARTHRITIS  Postoperative Diagnosis: LEFT HIP OSTEOARTHRITIS    Procedure(s):  LEFT TOTAL HIP ARTHROPLASTY ANTERIOR APPROACH WITH C-ARM  Surgeon(s) and Role:     * Wesley Mancia,  - Primary         Surgical Assistant: lillie    Surgical Staff:  Circ-1: Adrianna Still RN  Circ-2: Kevin Howard RN  Circ-Relief: Geo Siegel  Radiology Technician: Ryan Patterson  Scrub RN-1: Jack Boyle RN  Scrub RN-2: Zach Mata NP  Scrub RN-Relief: Kimber Bueno RN  Surg Asst-1: Duran Mcclain  Event Time In Time Out   Incision Start 1428    Incision Close 1519      Anesthesia: General   Estimated Blood Loss: 200 ml  IVF 2000 ml  Specimens: * No specimens in log *   Findings: severe oa    Complications: none  Implants:   Implant Name Type Inv.  Item Serial No.  Lot No. LRB No. Used Action   CUP ACET SECTOR GRIPTION 52MM -- TI - [id number]  CUP ACET SECTOR GRIPTION 52MM -- TI  [facility] [employer] [id number] Left 1 Implanted   LINER ACET PINN NEUT [id number] -- ALTRX - [id number]  LINER ACET PINN NEUT [id number] -- ALTRX  [facility] [id number] Left 1 Implanted   ELIMINATOR APEX HOLE POSITIVE - [id number]  ELIMINATOR APEX HOLE POSITIVE  [facility] [id number] Left 1 Implanted   STEM FEM TAPR SZ5 HI OFFSET -- ACTIS - [id number]  STEM FEM TAPR SZ5 HI OFFSET -- ACTIS  [facility] [employer] [id number] Left 1 Implanted   HEAD FEM 36MM +1.5MM NK -- BIOLOX DELTA - [id number]  HEAD FEM 36MM +1.5MM NK -- BIOLOX DELTA  [facility] [employer] [id number] Left 1 Implanted

## 2019-12-19 NOTE — ANESTHESIA PREPROCEDURE EVALUATION
Relevant Problems   No relevant active problems       Anesthetic History        Pertinent negatives: No PONV  Comments: Hypotension, with TKA 12 years ago. Review of Systems / Medical History  Patient summary reviewed, nursing notes reviewed and pertinent labs reviewed    Pulmonary        Sleep apnea (was told to get machine): No treatment      Pertinent negatives: No COPD, asthma and smoker  Comments: asbestosis   Neuro/Psych         Psychiatric history (anxiety)     Cardiovascular                Pertinent negatives: No hypertension, past MI and CAD  Exercise tolerance: >4 METS     GI/Hepatic/Renal  Within defined limits           Pertinent negatives: No GERD, liver disease and renal disease   Endo/Other        Obesity and arthritis     Other Findings   Comments: Recent epidural injections neck & lumbar.      ETOH < 1/ week             Physical Exam    Airway  Mallampati: II  TM Distance: 4 - 6 cm  Neck ROM: normal range of motion   Mouth opening: Normal     Cardiovascular               Dental  No notable dental hx       Pulmonary  Breath sounds clear to auscultation               Abdominal  GI exam deferred       Other Findings            Anesthetic Plan    ASA: 2  Anesthesia type: general          Induction: Intravenous  Anesthetic plan and risks discussed with: Patient and Spouse

## 2019-12-19 NOTE — ROUTINE PROCESS
[address] [place] [facility] TRANSFER - IN REPORT: 
 
Verbal report received from MIGUEL Rodrigues RN(name) on Corwin Caden  being received from [email or web address]) for routine post - op Report consisted of patients Situation, Background, Assessment and  
Recommendations(SBAR). Information from the following report(s) SBAR, Kardex and MAR was reviewed with the receiving nurse. Opportunity for questions and clarification was provided. Assessment completed upon patients arrival to unit and care assumed.

## 2019-12-19 NOTE — PERIOP NOTES
Reviewed PTA medication list with patient/caregiver and patient/caregiver denies any additional medications. Patient admits to having a responsible adult care for them for at least 24 hours after surgery.     Permission granted by patient for a dual skin assessment. Dual skin assessment completed by Allie Ren RN and Meredith Sanches.

## 2019-12-20 VITALS
HEIGHT: 69 IN | HEART RATE: 92 BPM | WEIGHT: 220.25 LBS | RESPIRATION RATE: 16 BRPM | OXYGEN SATURATION: 100 % | TEMPERATURE: 98.3 F | DIASTOLIC BLOOD PRESSURE: 86 MMHG | BODY MASS INDEX: 32.62 KG/M2 | SYSTOLIC BLOOD PRESSURE: 126 MMHG

## 2019-12-20 PROBLEM — R03.0 ELEVATED BLOOD PRESSURE READING WITHOUT DIAGNOSIS OF HYPERTENSION: Status: ACTIVE | Noted: 2019-12-20

## 2019-12-20 PROBLEM — H53.9 VISION CHANGES: Status: ACTIVE | Noted: 2019-12-20

## 2019-12-20 LAB
ANION GAP SERPL CALC-SCNC: 6 MMOL/L (ref 3–18)
BUN SERPL-MCNC: 14 MG/DL (ref 7–18)
BUN/CREAT SERPL: 13 (ref 12–20)
CALCIUM SERPL-MCNC: 8.4 MG/DL (ref 8.5–10.1)
CHLORIDE SERPL-SCNC: 105 MMOL/L (ref 100–111)
CHOLEST SERPL-MCNC: 182 MG/DL
CO2 SERPL-SCNC: 28 MMOL/L (ref 21–32)
CREAT SERPL-MCNC: 1.09 MG/DL (ref 0.6–1.3)
ERYTHROCYTE [DISTWIDTH] IN BLOOD BY AUTOMATED COUNT: 13.8 % (ref 11.6–14.5)
GLUCOSE SERPL-MCNC: 128 MG/DL (ref 74–99)
HBA1C MFR BLD: 5.4 % (ref 4.2–5.6)
HCT VFR BLD AUTO: 42.2 % (ref 36–48)
HCT VFR BLD AUTO: 42.3 % (ref 36–48)
HDLC SERPL-MCNC: 43 MG/DL (ref 40–60)
HDLC SERPL: 4.2 {RATIO} (ref 0–5)
HGB BLD-MCNC: 14.2 G/DL (ref 13–16)
HGB BLD-MCNC: 14.5 G/DL (ref 13–16)
LDLC SERPL CALC-MCNC: 122.8 MG/DL (ref 0–100)
LIPID PROFILE,FLP: ABNORMAL
MCH RBC QN AUTO: 29.3 PG (ref 24–34)
MCHC RBC AUTO-ENTMCNC: 34.3 G/DL (ref 31–37)
MCV RBC AUTO: 85.5 FL (ref 74–97)
PLATELET # BLD AUTO: 206 K/UL (ref 135–420)
PMV BLD AUTO: 9.8 FL (ref 9.2–11.8)
POTASSIUM SERPL-SCNC: 4.4 MMOL/L (ref 3.5–5.5)
RBC # BLD AUTO: 4.95 M/UL (ref 4.7–5.5)
SODIUM SERPL-SCNC: 139 MMOL/L (ref 136–145)
TRIGL SERPL-MCNC: 81 MG/DL (ref ?–150)
TSH SERPL DL<=0.05 MIU/L-ACNC: 0.45 UIU/ML (ref 0.36–3.74)
VLDLC SERPL CALC-MCNC: 16.2 MG/DL
WBC # BLD AUTO: 11.4 K/UL (ref 4.6–13.2)

## 2019-12-20 PROCEDURE — 97165 OT EVAL LOW COMPLEX 30 MIN: CPT

## 2019-12-20 PROCEDURE — 74011250636 HC RX REV CODE- 250/636: Performed by: ORTHOPAEDIC SURGERY

## 2019-12-20 PROCEDURE — 85018 HEMOGLOBIN: CPT

## 2019-12-20 PROCEDURE — 83036 HEMOGLOBIN GLYCOSYLATED A1C: CPT

## 2019-12-20 PROCEDURE — 84443 ASSAY THYROID STIM HORMONE: CPT

## 2019-12-20 PROCEDURE — 97535 SELF CARE MNGMENT TRAINING: CPT

## 2019-12-20 PROCEDURE — 97110 THERAPEUTIC EXERCISES: CPT

## 2019-12-20 PROCEDURE — 97116 GAIT TRAINING THERAPY: CPT

## 2019-12-20 PROCEDURE — 36415 COLL VENOUS BLD VENIPUNCTURE: CPT

## 2019-12-20 PROCEDURE — 80061 LIPID PANEL: CPT

## 2019-12-20 PROCEDURE — 97530 THERAPEUTIC ACTIVITIES: CPT

## 2019-12-20 PROCEDURE — 74011250637 HC RX REV CODE- 250/637: Performed by: ORTHOPAEDIC SURGERY

## 2019-12-20 PROCEDURE — 85027 COMPLETE CBC AUTOMATED: CPT

## 2019-12-20 PROCEDURE — 80048 BASIC METABOLIC PNL TOTAL CA: CPT

## 2019-12-20 RX ORDER — ENOXAPARIN SODIUM 100 MG/ML
40 INJECTION SUBCUTANEOUS DAILY
Qty: 21 SYRINGE | Refills: 0 | Status: SHIPPED | OUTPATIENT
Start: 2019-12-20

## 2019-12-20 RX ORDER — OXYCODONE HYDROCHLORIDE 10 MG/1
10 TABLET ORAL
Qty: 42 TAB | Refills: 0 | Status: SHIPPED | OUTPATIENT
Start: 2019-12-20 | End: 2019-12-20

## 2019-12-20 RX ORDER — CELECOXIB 200 MG/1
200 CAPSULE ORAL 2 TIMES DAILY
Qty: 60 CAP | Refills: 2 | Status: SHIPPED | OUTPATIENT
Start: 2019-12-20 | End: 2020-03-19

## 2019-12-20 RX ORDER — LANOLIN ALCOHOL/MO/W.PET/CERES
325 CREAM (GRAM) TOPICAL 3 TIMES DAILY
Qty: 90 TAB | Refills: 0 | Status: SHIPPED | OUTPATIENT
Start: 2019-12-20

## 2019-12-20 RX ORDER — OXYCODONE HYDROCHLORIDE 10 MG/1
10 TABLET ORAL
Qty: 42 TAB | Refills: 0 | Status: SHIPPED | OUTPATIENT
Start: 2019-12-20 | End: 2020-01-03

## 2019-12-20 RX ORDER — SODIUM CHLORIDE 9 MG/ML
15 INJECTION INTRAMUSCULAR; INTRAVENOUS; SUBCUTANEOUS ONCE
Status: DISCONTINUED | OUTPATIENT
Start: 2019-12-20 | End: 2019-12-20 | Stop reason: HOSPADM

## 2019-12-20 RX ADMIN — ENOXAPARIN SODIUM 40 MG: 40 INJECTION, SOLUTION INTRAVENOUS; SUBCUTANEOUS at 08:38

## 2019-12-20 RX ADMIN — FERROUS SULFATE TAB 325 MG (65 MG ELEMENTAL FE) 325 MG: 325 (65 FE) TAB at 08:38

## 2019-12-20 RX ADMIN — ACETAMINOPHEN 650 MG: 325 TABLET ORAL at 06:07

## 2019-12-20 RX ADMIN — Medication 10 ML: at 06:07

## 2019-12-20 RX ADMIN — CELECOXIB 200 MG: 100 CAPSULE ORAL at 08:37

## 2019-12-20 RX ADMIN — Medication 2 G: at 06:07

## 2019-12-20 RX ADMIN — ACETAMINOPHEN 650 MG: 325 TABLET ORAL at 12:49

## 2019-12-20 NOTE — PROGRESS NOTES
Called floor at 06:00am for MRI SAFETY SCREENING to be completed. Will get patient down for MRI when completed and e-signed.   [address]

## 2019-12-20 NOTE — PROGRESS NOTES
Problem: Mobility Impaired (Adult and Pediatric)  Goal: *Acute Goals and Plan of Care (Insert Text)  Description  In 1-7 days pt will be able to perform:  ST.  Bed mobility:  Rolling L to R to L modified independent for positioning. 2.  Supine to sit to supine S with HR for meals. 3.  Sit to stand to sit S with RW in prep for ambulation. LT.  Gait:  Ambulate >150ft S with RW, WBAT, for home/community mobility. 2.  Stair Negotiation:  Ascend/descend >2 steps CGA with HR for home entry. 3.  Activity tolerance: Tolerate up in chair 1-2 hours for ADLs. 4.  Patient/Family Education:  Patient/family to be independent with HEP for follow-up care and safe discharge. Note:   PHYSICAL THERAPY EVALUATION    Patient: Negra Reynoso (49 y.o. male)  Date: 2019  Primary Diagnosis: Status post THR (total hip replacement) [Z96.649]  Procedure(s) (LRB):  LEFT TOTAL HIP ARTHROPLASTY ANTERIOR APPROACH WITH C-ARM (Left) Day of Surgery   Precautions:   Fall, WBAT    ASSESSMENT :  Based on the objective data described below, the patient presents with decreased functional mobility and independence in regard to bed mobility, transfers, gt quality and tolerance, L hip AROM, L hip strength, pain, balance, activity tolerance, stair negotiation and safety due to recent L ROCÍO surgery. Pt rating pain on numerical pain scale 7/10. Pt required CGA for supine>sit<>stand. Pt required vc for safe techniques. Pt able to participate in gt training w/ RW, WBAT, GB and CGA in hallway w/ antalgic pattern. Pt returned to supine in bed w/ all needs within reach, SCDs applied and ice pack to L hip. Nurse Zechariah Howard aware. [facility] upon hospital d/c. Patient will benefit from skilled intervention to address the above impairments.   Patients rehabilitation potential is considered to be Good  Factors which may influence rehabilitation potential include:   []         None noted  []         Mental ability/status  [] Medical condition  []         Home/family situation and support systems  []         Safety awareness  [x]         Pain tolerance/management  []         Other:      PLAN :  Recommendations and Planned Interventions:  [x]           Bed Mobility Training             []    Neuromuscular Re-Education  [x]           Transfer Training                   []    Orthotic/Prosthetic Training  [x]           Gait Training                          []    Modalities  [x]           Therapeutic Exercises          []    Edema Management/Control  [x]           Therapeutic Activities            [x]    Patient and Family Training/Education  []           Other (comment):    Frequency/Duration: Patient will be followed by physical therapy twice daily to address goals. Discharge Recommendations: Home Health  Further Equipment Recommendations for Discharge: N/A     SUBJECTIVE:   Patient stated I feel pretty good but I am always in pain.     OBJECTIVE DATA SUMMARY:     Past Medical History:   Diagnosis Date    Adverse effect of anesthesia     drop in bp    Arthritis     hip    Kidney stone     Pinched nerve in neck     epidural injection november 2019    Psychiatric disorder     anxiety    Sleep apnea     no machine     Past Surgical History:   Procedure Laterality Date    HX HEENT      broken nose    HX KNEE ARTHROSCOPY      *3 right; *2 left    HX KNEE REPLACEMENT      left *2    HX ORTHOPAEDIC      metal plate 2 screws on right knee    HX OTHER SURGICAL      epidural neck and back     Barriers to Learning/Limitations: None  Compensate with: visual, verbal, tactile, kinesthetic cues/model  Prior Level of Function/Home Situation:   Home Situation  Home Environment: Private residence  # Steps to Enter: 3  Rails to Enter: Yes  Hand Rails : Bilateral  One/Two Story Residence: Two story, live on 1st floor  Living Alone: No  Support Systems: Spouse/Significant Other/Partner  Patient Expects to be Discharged to[de-identified] Private residence  Current DME Used/Available at Home: [place] Behavior:  Neurologic State: Alert; Appropriate for age  Orientation Level: Oriented X4  Cognition: Appropriate decision making; Appropriate for age attention/concentration; Appropriate safety awareness; Follows commands  Safety/Judgement: Awareness of environment  Psychosocial  Patient Behaviors: Calm; Cooperative  Family  Behaviors: Supportive;Calm  Purposeful Interaction: Yes  Pt Identified Daily Priority: Clinical issues (comment)  Caring Interventions: Reassure  Reassure: Therapeutic listening; Informing  Skin Condition/Temp: Dry;Warm  Family  Behaviors: Supportive;Calm  Skin Integrity: Incision (comment)(L hip)  Skin Integumentary  Skin Color: Appropriate for ethnicity  Skin Condition/Temp: Dry;Warm  Skin Integrity: Incision (comment)(L hip)  Turgor: Non-tenting  Hair Growth: Present  Varicosities: Absent  Strength:    Strength: Generally decreased, functional  Tone & Sensation:   Tone: Normal  Sensation: Intact  Range Of Motion:  AROM: Generally decreased, functional  Functional Mobility:  Bed Mobility:  Supine to Sit: Contact guard assistance  Sit to Supine: Contact guard assistance  Transfers:  Sit to Stand: Contact guard assistance(vc; bed elevated)  Stand to Sit: Contact guard assistance(vc)  Balance:   Sitting: Intact  Standing: Intact; With support  Ambulation/Gait Training:  Distance (ft): 180 Feet (ft)  Assistive Device: Walker, rolling;Gait belt  Ambulation - Level of Assistance: Contact guard assistance(vc)  Gait Abnormalities: Antalgic;Decreased step clearance; Step to gait  Left Side Weight Bearing: As tolerated  Base of Support: Shift to right  Stance: Left decreased  Speed/Cassandra: Slow  Step Length: Left shortened;Right shortened  Swing Pattern: Left asymmetrical;Right asymmetrical  Interventions: Safety awareness training; Tactile cues; Verbal cues; Visual/Demos  Therapeutic Exercises:   Encouraged ankle pumps, ankle circles, quad sets and heel slides  Pain:  Pain Scale 1: Numeric (0 - 10)  Pain Intensity 1: 7  Pain Location 1: Hip  Pain Orientation 1: Left  Pain Description 1: Aching  Pain Intervention(s) 1: Medication (see MAR)  Activity Tolerance:   Fair+  Please refer to the flowsheet for vital signs taken during this treatment. After treatment:   [x]         Patient left in no apparent distress sitting up EOB  []         Patient left in no apparent distress in bed  [x]         Call bell left within reach  [x]         Nursing notified  []         Caregiver present  []         Bed alarm activated    COMMUNICATION/EDUCATION:   [x]         Fall prevention education was provided and the patient/caregiver indicated understanding. [x]         Patient/family have participated as able in goal setting and plan of care. [x]         Patient/family agree to work toward stated goals and plan of care. []         Patient understands intent and goals of therapy, but is neutral about his/her participation. []         Patient is unable to participate in goal setting and plan of care.     Thank you for this referral.  Ana Campbell, PT   Time Calculation: 30 mins       Eval Complexity: History: HIGH Complexity :3+ comorbidities / personal factors will impact the outcome/ POC Exam:MEDIUM Complexity : 3 Standardized tests and measures addressing body structure, function, activity limitation and / or participation in recreation  Presentation: LOW Complexity : Stable, uncomplicated  Clinical Decision Making:Low Complexity    Overall Complexity:LOW

## 2019-12-20 NOTE — PROGRESS NOTES
RN called and stated that pt is aware that Dr. Julissa Persaud plans to DC home today if ok with Medicine team after work-up complete. However, pt refusing entire TIA work-up. Does not wish to complete any of the planned studies. Medicine team will sign off since pt refusing work-up and BP now under reasonable control. Would highly recommend that patient follow-up with his primary care physician for further monitoring of elevated blood pressure without history of hypertension. Medicine team is happy to answer any further questions or reconsult should the need arise, thank you.

## 2019-12-20 NOTE — PROGRESS NOTES
OCCUPATIONAL THERAPY EVALUATION/DISCHARGE    Patient: Itzel Flores (73 y.o. male)  Date: 12/20/2019  Primary Diagnosis: Status post THR (total hip replacement) [Z96.649]  Procedure(s) (LRB):  LEFT TOTAL HIP ARTHROPLASTY ANTERIOR APPROACH WITH C-ARM (Left) 1 Day Post-Op   Precautions:   Fall, WBAT  PLOF: independent with ADLs and transfers     ASSESSMENT AND RECOMMENDATIONS:  Based on the objective data described below, the patient presents with requiring S for ADLs and transfers following L THR. Pt presented seated in chair at the beginning of session. Pt participated with UB and LB dressing, bathroom mobility, toilet transfers, toileting and grooming with S during this session. Pt was educated on home safety and fall prevention at home. Pt and spouse verbalized understanding for the same. Pt was left seated in chair at the end of session in NAD, pain 0/10. Skilled occupational therapy is not indicated at this time. Discharge Recommendations: None  Further Equipment Recommendations for Discharge: N/A      SUBJECTIVE:   Patient stated Sally Omar doing alright .     OBJECTIVE DATA SUMMARY:     Past Medical History:   Diagnosis Date    Adverse effect of anesthesia     drop in bp    Arthritis     hip    Kidney stone     Pinched nerve in neck     epidural injection november 2019    Psychiatric disorder     anxiety    Sleep apnea     no machine     Past Surgical History:   Procedure Laterality Date    HX HEENT      broken nose    HX KNEE ARTHROSCOPY      *3 right; *2 left    HX KNEE REPLACEMENT      left *2    HX ORTHOPAEDIC      metal plate 2 screws on right knee    HX OTHER SURGICAL      epidural neck and back     Barriers to Learning/Limitations: None  Compensate with: visual, verbal, tactile, kinesthetic cues/model    Home Situation:   Home Situation  Home Environment: Private residence  # Steps to Enter: 3  Rails to Enter: Yes  Hand Rails : Bilateral  One/Two Story Residence: Two story, live on 1st floor  Lift Chair Available: No  Living Alone: No  Support Systems: Spouse/Significant Other/Partner  Patient Expects to be Discharged to[de-identified] Private residence  Current DME Used/Available at Home: Cane, straight, Walker  Tub or Shower Type: Tub/Shower combination  []     Right hand dominant   []     Left hand dominant    Cognitive/Behavioral Status:  Neurologic State: Alert  Orientation Level: Oriented X4  Cognition: Appropriate for age attention/concentration; Follows commands  Safety/Judgement: Fall prevention    Skin: intact  Edema: none    Vision/Perceptual:    Tracking: Able to track stimulus in all quadrants w/o difficulty    Coordination: BUE  Coordination: Within functional limits  Fine Motor Skills-Upper: Left Intact; Right Intact    Gross Motor Skills-Upper: Left Intact; Right Intact  Balance:  Sitting: Intact  Standing: Intact; With support  Strength: BUE  Strength: Generally decreased, functional  Tone & Sensation: BUE  Tone: Normal  Sensation: Intact  Range of Motion: BUE  AROM: Generally decreased, functional  Functional Mobility and Transfers for ADLs:  Bed Mobility:  Supine to Sit: Supervision  Sit to Supine: Supervision  Scooting: Supervision  Transfers:  Sit to Stand: Supervision  Stand to Sit: Supervision   Toilet Transfer : Supervision   Bathroom Mobility: Supervision/set up  ADL Assessment:  Feeding: Independent    Oral Facial Hygiene/Grooming: Supervision    Upper Body Dressing: Independent    Lower Body Dressing: Supervision    Toileting: Supervision  ADL Intervention:  Grooming  Grooming Assistance: Supervision  Position Performed: Standing  Washing Face: Supervision  Washing Hands: Supervision  Brushing Teeth: Supervision    Upper Body Dressing Assistance  Dressing Assistance: Independent  Pullover Shirt: Independent    Lower Body Dressing Assistance  Dressing Assistance: Supervision  Underpants: Supervision  Pants With Elastic Waist: Supervision  Leg Crossed Method Used: No  Position Performed: Seated in chair  Cues: Lizbeth Peterson  Toileting Assistance: Supervision  Bladder Hygiene: Supervision  Clothing Management: Supervision    Cognitive Retraining  Safety/Judgement: Fall prevention    Therapeutic Exercise:    Pain:  Pain level pre-treatment: 0/10   Pain level post-treatment: 0/10   Pain Intervention(s): Medication (see MAR); Rest, Ice, Repositioning   Response to intervention: Nurse notified, See doc flow    Activity Tolerance:   Good   Please refer to the flowsheet for vital signs taken during this treatment. After treatment:   [x]  Patient left in no apparent distress sitting up in chair  []  Patient left in no apparent distress in bed  [x]  Call bell left within reach  []  Nursing notified  [x]  Caregiver present  []  Bed alarm activated    COMMUNICATION/EDUCATION:   [x]      Role of Occupational Therapy in the acute care setting  [x]      Home safety education was provided and the patient/caregiver indicated understanding. [x]      Patient/family have participated as able and agree with findings and recommendations. []      Patient is unable to participate in plan of care at this time. Thank you for this referral.  Teresita Alexander OTR/L  Time Calculation: 20 mins      Eval Complexity: History: LOW Complexity : Brief history review ; Examination: LOW Complexity : 1-3 performance deficits relating to physical, cognitive , or psychosocial skils that result in activity limitations and / or participation restrictions ;    Decision Making:LOW Complexity : No comorbidities that affect functional and no verbal or physical assistance needed to complete eval tasks

## 2019-12-20 NOTE — PROGRESS NOTES
0727   Pt is awake, alert, oriented x4. Lying in bed. Mepilex silver to left hip dry and intact. Pt denies double vision. Pt stated his Bp is OK and refused  Tests ordered. Dr Samantha Clay and Dr Sevilla Adjutant were made aware earlier by outgoing shift. Dr Sevilla Adjutant ordered pt to go home today. MRI, CT, and Echo were made aware that pt refused tests. 8:44 AM Pt ate breakfast well. Lungs are clear. Abdomen obese, semi soft , with active BS. Pt was taught how to give his own Lovenox. Pt did well with self injection of Lovenox. 1102  Pt ambulated in hallway and stairs. Pt was cleared by PT for D/c.    1149    Pt was seen by OT. Pt ambulated to BR and get dressed up.    1300  Pt ate lunch well. INT removed. Discharge instructions given. Dual AVS reviewed with Ignacio Dutton RN. All medications reviewed individually with patient. Opportunities for questions and concerns provided. Patient's arm band appropriately discarded. 65   Pt discharged per wheelchair accompanied by family.

## 2019-12-20 NOTE — PROGRESS NOTES
Problem: Mobility Impaired (Adult and Pediatric)  Goal: *Acute Goals and Plan of Care (Insert Text)  Description  In 1-7 days pt will be able to perform:  ST.  Bed mobility:  Rolling L to R to L modified independent for positioning. 2.  Supine to sit to supine S with HR for meals. 3.  Sit to stand to sit S with RW in prep for ambulation. LT.  Gait:  Ambulate >150ft S with RW, WBAT, for home/community mobility. 2.  Stair Negotiation:  Ascend/descend >2 steps CGA with HR for home entry. 3.  Activity tolerance: Tolerate up in chair 1-2 hours for ADLs. 4.  Patient/Family Education:  Patient/family to be independent with HEP for follow-up care and safe discharge. 2019 1103 by Jose Funez PT  Outcome: Resolved/Met    PHYSICAL THERAPY TREATMENT/DISCHARGE    Patient: Olu Maxwell (4 y.o. male)  Date: 2019  Diagnosis: Status post THR (total hip replacement) [Z96.649]   Osteoarthritis of left hip  Procedure(s) (LRB):  LEFT TOTAL HIP ARTHROPLASTY ANTERIOR APPROACH WITH C-ARM (Left) 1 Day Post-Op  Precautions: Fall, WBAT  Chart, physical therapy assessment, plan of care and goals were reviewed. ASSESSMENT:  Pt found seated EOB on PT arrival.  Reports pain 4/10. At completion of session, pt demonstrates HEP accurately, bed mobility and transfers with supervision. Pt able to progress gt distance to 250ft with RW with reciprocal gt pattern. Note pt currently has standard walker at [facility] and able to demonstrate use with S using step to gt pattern. Pain less with use of RW. Pt instructed in stair nego and performed same as noted below with bilateral, then single HR use. Pt educated in safety during performance of all above. Left up in chair in room with spouse present and nurse notified pt ready to progress to next level of care with HHPT.   Progression toward goals:  [x]      Goals met  []      Improving appropriately and progressing toward goals  []      Improving slowly and progressing toward goals  []      Not making progress toward goals and plan of care will be adjusted     PLAN:  Patient will be discharged from physical therapy at this time. Rationale for discharge:  [x] Goals Achieved  [] [address]  [] Patient not participating in therapy  [] Other:  Discharge Recommendations:  Home Health  Further Equipment Recommendations for Discharge:  rolling walker (which pt will acquire from his brother by his report)     SUBJECTIVE:   Patient stated I feel fine.     OBJECTIVE DATA SUMMARY:   Critical Behavior:  Neurologic State: Alert  Orientation Level: Oriented X4  Cognition: Appropriate for age attention/concentration, Follows commands  Safety/Judgement: Fall prevention  Functional Mobility Training:  Bed Mobility:  Supine to Sit: Supervision  Sit to Supine: Supervision  Scooting: Supervision  Transfers:  Sit to Stand: Supervision  Stand to Sit: Supervision  Balance:  Sitting: Intact  Standing: Intact; With support  Ambulation/Gait Training:  Distance (ft): 250 Feet (ft)  Assistive Device: Gait belt;Walker, rolling (standard walker initially  Ambulation - Level of Assistance: Supervision  Gait Abnormalities: Antalgic;Decreased step clearance; Step to gait, progressed to reciprocal with use of RW  Left Side Weight Bearing: As tolerated  Speed/Cassandra: Pace decreased (<100 feet/min)  Step Length: Left shortened;Right shortened  Swing Pattern: Left asymmetrical;Right asymmetrical  Interventions: Safety awareness training;Verbal cues; Visual/Demos  Stairs:  Number of Stairs Trained: 8  Stairs - Level of Assistance: Contact guard assistance   Rail Use: Both(L ascending; R descending)  Therapeutic Exercises:   Demonstrates accurately for AP, QS, HS, LAQ and isometric hip adduction  Pain:  Pain Scale 1: Numeric (0 - 10)  Pain Intensity 1: 0 post; 4/10 pre  Pain Location 1: Hip  Pain Orientation 1: Left  Pain Description 1: Sore(post ex)  Pain Intervention(s) 1: Declines  Activity Tolerance:   good  Please refer to the flowsheet for vital signs taken during this treatment.   After treatment:   [x] Patient left in no apparent distress sitting up in chair  [] Patient left in no apparent distress in bed  [x] Call bell left within reach  [x] Nursing notified  [x] Caregiver present  [] Bed alarm activated  Michael Kim, PT   Time Calculation: 39 mins

## 2019-12-20 NOTE — CONSULTS
Medicine Consult    Patient:  Marielos Gonzales 75 y.o. male  Asked to evaluate patient by Dr. Sevilla Adjutant  Primary Care Provider:  Lauren Wagoner DO  Date of Admission:  12/19/2019  Reason for Consult: Vision change    Assessment/Plan   76-year-old male who is pretty healthy except for osteoarthritis of his left hip and BPH. He underwent a left total hip replacement today. Asked to consult for patient because he was having double vision and elevated blood pressure. Patient has no history of hypertension and does not take medications at home for high blood pressure. At the time of the vision change patient's blood pressure was elevated: 157/97. Patient noted that when his blood pressure improved, denies visual changes resolved.      Vision change -resolved   Treat as if possible TIA and ordered full CVA w/u >CTA head/neck, MRI w/o contrast, Echo with bubble study, HgbA1c and Lipid panel   CT head unremarkable  Will order MRI in a.m., patient will need sedative medication to tolerate  If w/u positive, will need to consult neurology    Elevated blood pressure without evidence of hypertension  Blood pressure at the time of initial consult was 157/97, current blood pressure is 138/90  Patient states that he is not experiencing pain and feels that his surgery went well  Will obtain routine lab work including BMP, CBC and TSH  Continue to monitor blood pressures start antihypertensive if systolic blood pressure is sustained greater than 140    Status post left total hip replacement  Defer management to orthopedic team      Patient Active Problem List   Diagnosis Code    Enlarged prostate with lower urinary tract symptoms (LUTS) N40.1    Male erectile dysfunction, unspecified N52.9    Feeling of incomplete bladder emptying R39.14    Urge incontinence N39.41    Calculus of both kidneys N20.0    Microscopic hematuria R31.29    Family history of prostate cancer Z80.42    Osteoarthritis of left hip M16.12    Status post THR (total hip replacement) K63.812       PLAN:    Thank you for allowing us to participate in this patients care. HPI:   CC:  Olu Maxwell is a 75 y.o. male with past medical history significant for GUERRERO requiring CPAP but he is not compliant with wearing his CPAP machine. He states that his other medical problems include arthritis in his left hip which is why he had a left total hip replacement done today as well as a pinched nerve in his cervical neck that Dr. Quillian Felty is managing for him. His only other medical problem stasis is recent diagnosis of anxiety which she states is been having for the past 3 months. He states that it is also accompanied by claustrophobia and he stated that he had to have sedation in order to get MRI he stated that this is all new for him and he is never had this type of symptoms before. He states that he can consider is not aware of any known acute triggers. He does state that he has stressors in his life he states that his mom is 80years old and is has Alzheimer's dementia which is difficult for him he also states that he has older brother who 79 who has a very complicated medical history and that has been causing him stress as well    He states that this evening when he came back from surgery at approximately 6 PM he was watching basketball and he states that he began \"seeing 2 of everything\". He states that he looked at the paper that was on his table and looked at his phone multiple other items to see if the double vision was present when he was looking at other items and it was present. He stated that the double vision began first as blurred vision and then progressed to him having double vision. At the time of the hospitalist exam patient symptoms have resolved.      Past Medical History:   Diagnosis Date    Adverse effect of anesthesia     drop in bp    Arthritis     hip    Kidney stone     Pinched nerve in neck     epidural injection november 2019    Psychiatric disorder     anxiety    Sleep apnea     no machine     Past Surgical History:   Procedure Laterality Date    HX HEENT      broken nose    HX KNEE ARTHROSCOPY      *3 right; *2 left    HX KNEE REPLACEMENT      left *2    HX ORTHOPAEDIC      metal plate 2 screws on right knee    HX OTHER SURGICAL      epidural neck and back      Social History     Tobacco Use    Smoking status: Former Smoker    Smokeless tobacco: Never Used   Substance Use Topics    Alcohol use: No     Alcohol/week: 0.0 standard drinks    Drug use: Never     History reviewed. No pertinent family history. No current facility-administered medications on file prior to encounter. Current Outpatient Medications on File Prior to Encounter   Medication Sig Dispense Refill    sildenafil citrate (VIAGRA) 100 mg tablet Take 1 Tab by mouth daily as needed (for erection). 6 Tab 11      Allergies   Allergen Reactions    Cialis [Tadalafil] Other (comments)     Blurry visiton           Review of Systems  Constitutional: No fever, chills, diaphoresis, malaise, fatigue or weight gain/loss or falls  Skin: no itching or rashes  HEENT: no ear discomfort, hearing loss, tinnitus, epistaxis or sore throat  EYES: no blurry vision, double vision or photophobia  CARDIOVASCULAR: no claudication, cp, palpitations, orthopnea, pnd or LE edema  PULMONARY: no cough, wheeze, shortness of breath or sputum production  GI: no nausea, vomiting, diarrhea, abdominal pain, melena, hematemesis or brbpr  : no dysuria, hematuria  MUSCULOSKELETAL: no back pain, joint pain or myalgias  ENDOCRINE: no heat/cold intolerance, polyuria or polydipsia  HEME: no easy bruising or bleeding  NEURO: no unilateral weakness, numbness, tingling or seizures      Physical Exam:      Visit Vitals  /90   Pulse 76   Temp 98.3 °F (36.8 °C)   Resp 15   Ht 5' 9\" (1.753 m)   Wt 99.9 kg (220 lb 4 oz)   SpO2 94%   BMI 32.53 kg/m²     Body mass index is 32.53 kg/m².     Physical Exam:  GEN: well nourished, laying in bed in no acute distress  HEENT: atraumatic, nose normal,oropharynx clear, MMM  NECK: supple, trachea midline, no supraclavicular or submandibular adenopathy noted  EYES: conjuctiva normal, lids with out lesions, PERRL  HEART: RRR with out m/r/g, pmi nondisplaced, pulses 2+ distally  LUNGS: equal chest wall expansion, cta bl with out wheezes/rales or rhonchi  AB: soft, +BS, nt/nd no organomegaly  NEURO: alert, awake and oriented x3, gait not assessed, cranial nerves intact, strength 5/5 bl UE and LE, sensation intact, reflexes nonpathological  SKIN: dry, intact, warm no breakdown noted        Laboratory Studies:    Recent Results (from the past 24 hour(s))   TYPE & SCREEN    Collection Time: 12/19/19 11:35 AM   Result Value Ref Range    Crossmatch Expiration 12/22/2019     ABO/Rh(D) B POSITIVE     Antibody screen NEG    HGB & HCT    Collection Time: 12/20/19  3:00 AM   Result Value Ref Range    HGB 14.2 13.0 - 16.0 g/dL    HCT 42.2 36.0 - 48.0 %

## 2019-12-20 NOTE — PROGRESS NOTES
Problem: Falls - Risk of  Goal: *Absence of Falls  Description  Document [place] [place] Fall Risk and appropriate interventions in the flowsheet.   Outcome: Progressing Towards Goal  Note: Fall Risk Interventions:  Mobility Interventions: Patient to call before getting OOB    Mentation Interventions: Door open when patient unattended    Medication Interventions: Patient to call before getting OOB    Elimination Interventions: Call light in reach, Patient to call for help with toileting needs

## 2019-12-20 NOTE — PROGRESS NOTES
conducted an initial consultation and Spiritual Assessment for Deandra Barkley, who is a 75 y.o.,male. According to the patients chart Religion Affiliation is: [facility].     The reason the Patient came to the hospital is:   Patient Active Problem List    Diagnosis Date Noted    Microscopic hematuria 07/20/2017     Priority: 1 - One    Family history of prostate cancer 07/20/2017     Priority: 1 - One    Calculus of both kidneys 07/16/2017     Priority: 1 - One    Enlarged prostate with lower urinary tract symptoms (LUTS) 03/18/2016     Priority: 1 - One    Feeling of incomplete bladder emptying 03/28/2016     Priority: 2 - Two    Urge incontinence 03/28/2016     Priority: 2 - Two    Male erectile dysfunction, unspecified 03/18/2016     Priority: 3 - Three    Vision changes 12/20/2019    Elevated blood pressure reading without diagnosis of hypertension 12/20/2019    Status post THR (total hip replacement) 12/19/2019    Osteoarthritis of left hip 12/18/2019        The  provided the following Interventions:  Initiated a relationship of care and support. Explored issues of moises, belief, spirituality and Tenriism/ritual needs while hospitalized. Listened empathically. Provided information about Spiritual Care Services. Offered prayer and assurance of continued prayers on patients behalf. Chart reviewed. The following outcomes were achieved:  Patient shared limited information about their medical narrative, spiritual journey and beliefs. Patient processed feelings about current hospitalization. Patient expressed gratitude for Spiritual Care visit. Assessment:  There are no significant spiritual or Tenriism issues which require further intervention at this time. Patient does not have any Tenriism or cultural needs that will affect patients preferences in health care. Plan:  Chaplains will continue to follow and will provide pastoral care as needed or requested.  recommends bedside caregivers page  on duty if patient shows signs of acute spiritual or emotional distress. [address] [facility] [place] Diez M.Div.   [address] [place] [address] Drive  [phone] - Office

## 2019-12-20 NOTE — PROGRESS NOTES
1932: Bedside report received from , RN. Assumed care of pt at this time. Pt in bed with no signs of distress. Pt left with call light within reach and encouraged to call for assistance. 2008: Head to toe assessment completed at this time  Patient is A&OX4. Pt denies N/V, chest pain, SOB or distress. Pt is calm and cooperative. Capillary refill less than 3 seconds. Skin is warm and dry with mepilex dressing on left hip and is clean, dry and intact. Lungs are clear bilaterally. Patient instructed on use and reason for incentive spirometer and able to do 2500 ml. Bowel sounds are active. Abdomen is soft and non-tender. STEPH & SCDS in place bilaterally. Positive dorsalis pedis pulse, sensation, warm. No tingling or numbness to lower extremities. 18 g needle in the left hand and is clean, dry and intact. Pain scale explained to patient. Reasons for taking PRN meds explained to patient. Patient instructed to call for prn when needed. Pain level is 5. Patient was oriented to call bell and bed function. Will continue to monitor. 2202: Patient rang call bell and stated that he has been seeing far sighted double/blurred vision since 6PM since he arrived on the floor. Patient stated Naina Madden figured to tell someone before leaving\". Patient's blood pressure is 154/98. Patient does not complain of headache, SOB or chest pain. 2204: Paged on call for Dr. Satya Braun regarding patient's blood pressures and patient's complaint of blurred/double vision. 2207: EDDIE Garcia given STAT hospitalist consult. 2215: Spoke to Dr. Raza Fairchild regarding patient's situation. Given orders for CT Head with contrast.     2220: Called CT and was sent to voicemail. 2230: CT called and said the order was wrong and that CT head with contrast is for cancer and for bleeding is without contrast.      2232: Paged Dr. Raza Fairchild. 2246: Changed order. CT said would call when ready. 2339: Patient off floor having CT scan. 0300: Patient denies blurry vision at the moment. Will continue to monitor. 5660: Dr. Beverely Hodgkins on floor and updated regarding patient's status and hospitalist orders. Dr. Beverely Hodgkins stated he plans to discharge patient today. 9990: Nurse entered room to give patient IV for contrast. Patient speaking in room to Dr. Beverely Hodgkins. Patient stated would only like to be treated for the hypertension and is declining MRI, Echo and CT scan. 9203: Paged Dr Miriam Robbins regarding patient's refusal. Medicine team is signing off. Shift summary: Patient ambulated with assistance using walker and voiding without issue. Patient had complaints of vision changes and received hospitalist consult as ordered by on call provider. Patient had CT scan of head completed. Patient declines CT, MRI and Echo. CT, MRI and Echo departments notified. Both Dr. Beverely Hodgkins and Dr. Miriam Robbins notified and aware. Patient is not complaining of blurred vision. He denies SOB, head ache or dizziness.

## 2019-12-20 NOTE — PROGRESS NOTES
Transition of Care (JADA) Plan:     Chart reviewed, met with pt and wife in room prior to pt discharge home. FOC offered, pt chose [facility] for follow up; referral placed with CMS. Pt has RW for home. JADA Transportation:   How is patient being transported at discharge? Family/Friend      When? Once cleared by Therapy between 12-2pm     Is transport scheduled? N/A      Follow-up appointment and transportation:   PCP/Specialist?  See AVS for Appointment         Who is transporting to the follow-up appointment? Family/Friend      Is transport for follow up appointment scheduled? N/A    Communication plan (with patient/family): Who is being called? Patient or Next of Kin? Responsible party? Patient      What number(s) is to be used? See Facesheet      What service provider is calling for [facility] services? When are they calling? 24-48 hours following discharge    Readmission Risk? (Green/Low; Yellow/Moderate; Red/High):  Green    Care Management Interventions  PCP Verified by CM:  Yes  Transition of Care Consult (CM Consult): [address]: No  Reason Outside [place]: Physician referred to specific agency([facility])  Discharge Durable Medical Equipment: No  Physical Therapy Consult: Yes  Occupational Therapy Consult: Yes  Current Support Network: Lives with Spouse  Confirm Follow Up Transport: Family  The Plan for Transition of Care is Related to the Following Treatment Goals : HH SN/PT  The Patient and/or Patient Representative was Provided with a Choice of Provider and Agrees with the Discharge Plan?: Yes  Freedom of Choice List was Provided with Basic Dialogue that Supports the Patient's Individualized Plan of Care/Goals, Treatment Preferences and Shares the Quality Data Associated with the Providers?: Yes  Discharge Location  Discharge Placement: Home with home health

## 2019-12-20 NOTE — ROUTINE PROCESS
Bedside shift change report given to Tamra Bergman RN (oncoming nurse) by Mildred YOUNGER RN (offgoing nurse). Report included the following information SBAR, Kardex and MAR.

## 2019-12-20 NOTE — ROUTINE PROCESS
1932 
Bedside and Verbal shift change report given to Kiki Martinez RN (oncoming nurse) by Julius Gonzalez RN (offgoing nurse). Report included the following information SBAR, Kardex and MAR.

## 2019-12-20 NOTE — PROGRESS NOTES
Progress Note    Patient: Radha Barksdale MRN: [id number]  CSN: [id number]    YOB: 1955  Age: 75 y.o. Sex: male    DOA: 12/19/2019 LOS:  LOS: 1 day                    Subjective:     Chief Complaint: No chief complaint on file. Post hip replacement in the hospital did had an episode of double vision which cleared once blood pressure improved CT of head done showed no hemorrhage CTA of head and neck and MRI ordered but since symptoms improved patient wanted to do the work-up as an outpatient patient is cleared from orthopedic standpoint to go home his blood pressure has improved and normalized to 125/82 double vision has resolved      Review of systems  General: No fevers or chills. Cardiovascular: No chest pain or pressure. No palpitations. Pulmonary: No shortness of breath, cough or wheeze. Gastrointestinal: No abdominal pain, nausea, vomiting or diarrhea. Genitourinary: No urinary frequency, urgency, hesitancy or dysuria. Musculoskeletal: + joint or muscle pain, no back pain, no recent trauma. Neurologic: No headache, numbness, tingling or weakness. Objective:     Physical Exam:  Visit Vitals  /82   Pulse 77   Temp 98.3 °F (36.8 °C)   Resp 15   Ht 5' 9\" (1.753 m)   Wt 99.9 kg (220 lb 4 oz)   SpO2 98%   BMI 32.53 kg/m²        General:         Alert, cooperative, no acute distress    HEENT: NC, Atraumatic. PERRLA, anicteric sclerae. Lungs: CTA Bilaterally. No Wheezing/Rhonchi/Rales. Heart:  Regular  rhythm,  No murmur, No Rubs, No Gallops  Abdomen: Soft, Non distended, Non tender.  +Bowel sounds, no HSM  Extremities: No c/c/e  Psych:   Good insight. Not anxious or agitated. Neurologic:  CN 2-12 grossly intact, Alert and oriented X 3. No acute neurological                                 Deficits,     Intake and Output:  Current Shift:  No intake/output data recorded.   Last three shifts:  12/18 1901 - 12/20 0700  In: 2206 [I.V.:2206]  Out: 2150 [Urine:2000]    Labs: Results:       Chemistry Recent Labs     12/20/19  0604   *      K 4.4      CO2 28   BUN 14   CREA 1.09   CA 8.4*   AGAP 6   BUCR 13      CBC w/Diff Recent Labs     12/20/19  0604 12/20/19  0300   WBC 11.4  --    RBC 4.95  --    HGB 14.5 14.2   HCT 42.3 42.2     --       Cardiac Enzymes No results for input(s): CPK, CKND1, LAUREN in the last 72 hours. No lab exists for component: CKRMB, TROIP   Coagulation No results for input(s): PTP, INR, APTT, INREXT in the last 72 hours. Lipid Panel No results found for: CHOL, CHOLPOCT, CHOLX, CHLST, CHOLV, 581076, HDL, HDLP, LDL, LDLC, DLDLP, 606220, VLDLC, VLDL, TGLX, TRIGL, TRIGP, TGLPOCT, CHHD, CHHDX   BNP No results for input(s): BNPP in the last 72 hours. Liver Enzymes No results for input(s): TP, ALB, TBIL, AP, SGOT, GPT in the last 72 hours.     No lab exists for component: DBIL   Thyroid Studies No results found for: T4, T3U, TSH, TSHEXT       Procedures/imaging: see electronic medical records for all procedures/Xrays and details which were not copied into this note but were reviewed prior to creation of Plan    Medications:   Current Facility-Administered Medications   Medication Dose Route Frequency    0.9% NaCl bacteriostatic (NORMAL SALINE) 0.9 % injection 15 mL  15 mL IntraVENous ONCE    lactated Ringers infusion  125 mL/hr IntraVENous CONTINUOUS    sodium chloride (NS) flush 5-40 mL  5-40 mL IntraVENous Q8H    sodium chloride (NS) flush 5-40 mL  5-40 mL IntraVENous PRN    acetaminophen (TYLENOL) tablet 650 mg  650 mg Oral Q6H    oxyCODONE IR (ROXICODONE) tablet 10 mg  10 mg Oral Q4H PRN    celecoxib (CELEBREX) capsule 200 mg  200 mg Oral BID    naloxone (NARCAN) injection 0.4 mg  0.4 mg IntraVENous PRN    HYDROmorphone (PF) (DILAUDID) injection 1 mg  1 mg IntraVENous Q4H PRN    ferrous sulfate tablet 325 mg  1 Tab Oral TID    ondansetron (ZOFRAN) injection 4 mg  4 mg IntraVENous Q4H PRN    enoxaparin (LOVENOX) injection 40 mg  40 mg SubCUTAneous DAILY       Assessment/Plan     Principal Problem:    Osteoarthritis of left hip (12/18/2019)    Active Problems:    Status post THR (total hip replacement) (12/19/2019)      Vision changes (12/20/2019)      Elevated blood pressure reading without diagnosis of hypertension (12/20/2019)    Plan: Patient agrees to return to the emergency room if his vision worsens he will then need MRI CTA he is to bring a blood pressure log of his blood pressure for his PCP Dr. Jennifer Donnelly he will follow-up as an outpatient and contracts to safety      Case discussed with:  [x]Patient  [x]Family  []Nursing  []Case Management  DVT Prophylaxis:  []Lovenox  []Hep SQ  []SCDs  []Coumadin   []On Heparin gtt    Luis Larkin MD  12/20/2019 7:41 AM

## 2019-12-20 NOTE — PROGRESS NOTES
Progress Note      Patient: Niranjan Sarabia               Sex: male          DOA: 12/19/2019     YOB: 1955      Age:  75 y.o.        LOS:  LOS: 1 day     Status Post: Procedure(s):  LEFT TOTAL HIP ARTHROPLASTY ANTERIOR APPROACH WITH C-ARM  Surgery Date: 12/19/2019            Subjective:     Niranjan Sarabia is a 75 y.o. male who c/o mild pain  some complaint of blurred vision overnight  Medical consult   imaging ordered  Patient now asymptomatic  Plan for d/c if cleared with with medicine  Patient denies any complaint of calf pain/ SOB or difficulty breathing. Objective:      Visit Vitals  /90   Pulse 76   Temp 98.3 °F (36.8 °C)   Resp 15   Ht 5' 9\" (1.753 m)   Wt 99.9 kg (220 lb 4 oz)   SpO2 94%   BMI 32.53 kg/m²       Physical Exam:   Dressing:  clean, dry, intact  Wiggles Toes/Ankle  Foot sensation intact to light touch  No foot edema/ +1 Posterior Tibial Pulse  Leg Lengths appear equal    Xray - good    Intake and Output:  Current Shift:  12/19 1901 - 12/20 0700  In: 206 [I.V.:206]  Out: 950 [Urine:950]  Last three shifts:  12/18 0701 - 12/19 1900  In: 2000 [I.V.:2000]  Out: 800 [Urine:650]  Voiding Status:  + void without need for Lazar catheter    Lab/Data Reviewed:  Recent Labs     12/20/19  0604   HGB 14.5   HCT 42.3      K 4.4   BUN 14   CREA 1.09   *         Medications Reviewed    Assessment/Plan     Principal Problem:    Osteoarthritis of left hip (12/18/2019)    Active Problems:    Status post THR (total hip replacement) (12/19/2019)      Vision changes (12/20/2019)      Elevated blood pressure reading without diagnosis of hypertension (12/20/2019)        · Discontinue Oxygen. · Change IV to Saline Lock. · Discharge Planning for home.   · Begin DVT Prophylaxis - Lovenox 40 mg SQ daily   · Discharge home today if cleared by physical therapy and medical consult

## (undated) DEVICE — STERILE POLYISOPRENE POWDER-FREE SURGICAL GLOVES: Brand: PROTEXIS

## (undated) DEVICE — ADHESIVE SKIN CLOSURE 4X22 CM PREMIERPRO EXOFINFUSION DISP

## (undated) DEVICE — THE CANADY HYBRID PLASMA SCALPEL IS AN ELECTROSURGICAL PLASMA SCALPEL THAT USES AN 85MM BENDABLE PADDLE BLADE TIP. THE ELECTROSURGICAL PLASMA SCALPEL IS USED TO SIMULTANEOUSLY CUT AND COAGULATE BIOLOGICAL TISSUE.: Brand: CANADY HYBRID PLASMA PADDLE BLADE

## (undated) DEVICE — NEEDLE SPNL 20GA L3.5IN YEL HUB S STL REG WALL FIT STYL W/

## (undated) DEVICE — GARMENT,MEDLINE,DVT,INT,CALF,MED, GEN2: Brand: MEDLINE

## (undated) DEVICE — STERILE POLYISOPRENE POWDER-FREE SURGICAL GLOVES WITH EMOLLIENT COATING: Brand: PROTEXIS

## (undated) DEVICE — BONE WAX WHITE: Brand: BONE WAX WHITE

## (undated) DEVICE — DRESSING FOAM SELF ADH 20X10 CM ABSORBENT MEPILEX BORDER

## (undated) DEVICE — SOL IRR STRL H2O 1500ML BTL --

## (undated) DEVICE — SOL IRRIGATION INJ NACL 0.9% 500ML BTL

## (undated) DEVICE — BLADE SAW 1.27X13X90 MM FOR LG BNE

## (undated) DEVICE — SUT VCRL + 1 36IN CT1 VIO --

## (undated) DEVICE — REM POLYHESIVE ADULT PATIENT RETURN ELECTRODE: Brand: VALLEYLAB

## (undated) DEVICE — Device

## (undated) DEVICE — PACK PROCEDURE SURG ANTR HIP

## (undated) DEVICE — HANDPIECE SET WITH FAN SPRAY TIP: Brand: INTERPULSE

## (undated) DEVICE — SOL INJ L R 1000ML BG --

## (undated) DEVICE — GOWN,SIRUS,NONRNF,SETINSLV,XL,20/CS: Brand: MEDLINE

## (undated) DEVICE — TOWEL SURG W17XL27IN STD BLU COT NONFENESTRATED PREWASHED

## (undated) DEVICE — SUT VCRL + 2-0 27IN CT2 UD -- 36/BX

## (undated) DEVICE — (D)PREP SKN CHLRAPRP APPL 26ML -- CONVERT TO ITEM 371833

## (undated) DEVICE — T5 HOOD WITH PEEL AWAY FACE SHIELD

## (undated) DEVICE — SYR LR LCK 1ML GRAD NSAF 30ML --